# Patient Record
Sex: FEMALE | Race: WHITE | NOT HISPANIC OR LATINO
[De-identification: names, ages, dates, MRNs, and addresses within clinical notes are randomized per-mention and may not be internally consistent; named-entity substitution may affect disease eponyms.]

---

## 2017-01-20 ENCOUNTER — APPOINTMENT (OUTPATIENT)
Dept: PLASTIC SURGERY | Facility: CLINIC | Age: 52
End: 2017-01-20

## 2017-05-05 ENCOUNTER — APPOINTMENT (OUTPATIENT)
Dept: PLASTIC SURGERY | Facility: CLINIC | Age: 52
End: 2017-05-05

## 2017-07-14 ENCOUNTER — APPOINTMENT (OUTPATIENT)
Dept: PLASTIC SURGERY | Facility: CLINIC | Age: 52
End: 2017-07-14

## 2017-07-28 ENCOUNTER — APPOINTMENT (OUTPATIENT)
Dept: PLASTIC SURGERY | Facility: CLINIC | Age: 52
End: 2017-07-28
Payer: COMMERCIAL

## 2017-12-08 ENCOUNTER — APPOINTMENT (OUTPATIENT)
Dept: PLASTIC SURGERY | Facility: CLINIC | Age: 52
End: 2017-12-08
Payer: COMMERCIAL

## 2018-03-16 ENCOUNTER — RESULT REVIEW (OUTPATIENT)
Age: 53
End: 2018-03-16

## 2018-03-16 ENCOUNTER — OUTPATIENT (OUTPATIENT)
Dept: OUTPATIENT SERVICES | Facility: HOSPITAL | Age: 53
LOS: 1 days | End: 2018-03-16
Payer: MEDICAID

## 2018-03-16 PROCEDURE — 88321 CONSLTJ&REPRT SLD PREP ELSWR: CPT

## 2018-03-19 ENCOUNTER — APPOINTMENT (OUTPATIENT)
Dept: GYNECOLOGIC ONCOLOGY | Facility: CLINIC | Age: 53
End: 2018-03-19
Payer: MEDICAID

## 2018-03-19 VITALS
BODY MASS INDEX: 22.08 KG/M2 | HEART RATE: 76 BPM | HEIGHT: 62 IN | WEIGHT: 120 LBS | DIASTOLIC BLOOD PRESSURE: 82 MMHG | SYSTOLIC BLOOD PRESSURE: 127 MMHG

## 2018-03-19 PROCEDURE — 57452 EXAM OF CERVIX W/SCOPE: CPT

## 2018-03-19 PROCEDURE — 99204 OFFICE O/P NEW MOD 45 MIN: CPT | Mod: 25

## 2018-03-20 LAB — SURGICAL PATHOLOGY STUDY: SIGNIFICANT CHANGE UP

## 2018-03-26 ENCOUNTER — OUTPATIENT (OUTPATIENT)
Dept: OUTPATIENT SERVICES | Facility: HOSPITAL | Age: 53
LOS: 1 days | End: 2018-03-26

## 2018-03-26 VITALS
DIASTOLIC BLOOD PRESSURE: 64 MMHG | HEART RATE: 60 BPM | WEIGHT: 119.93 LBS | SYSTOLIC BLOOD PRESSURE: 100 MMHG | RESPIRATION RATE: 20 BRPM | TEMPERATURE: 98 F | HEIGHT: 61.25 IN

## 2018-03-26 DIAGNOSIS — D06.9 CARCINOMA IN SITU OF CERVIX, UNSPECIFIED: ICD-10-CM

## 2018-03-26 DIAGNOSIS — Z98.891 HISTORY OF UTERINE SCAR FROM PREVIOUS SURGERY: Chronic | ICD-10-CM

## 2018-03-26 DIAGNOSIS — Z98.82 BREAST IMPLANT STATUS: Chronic | ICD-10-CM

## 2018-03-26 DIAGNOSIS — M21.611 BUNION OF RIGHT FOOT: Chronic | ICD-10-CM

## 2018-03-26 DIAGNOSIS — M21.612 BUNION OF LEFT FOOT: Chronic | ICD-10-CM

## 2018-03-26 DIAGNOSIS — D09.9 CARCINOMA IN SITU, UNSPECIFIED: ICD-10-CM

## 2018-03-26 LAB
HCG SERPL-ACNC: < 5 MIU/ML — SIGNIFICANT CHANGE UP
HCT VFR BLD CALC: 44.1 % — SIGNIFICANT CHANGE UP (ref 34.5–45)
HGB BLD-MCNC: 14.9 G/DL — SIGNIFICANT CHANGE UP (ref 11.5–15.5)
MCHC RBC-ENTMCNC: 30.1 PG — SIGNIFICANT CHANGE UP (ref 27–34)
MCHC RBC-ENTMCNC: 33.8 % — SIGNIFICANT CHANGE UP (ref 32–36)
MCV RBC AUTO: 89.1 FL — SIGNIFICANT CHANGE UP (ref 80–100)
NRBC # FLD: 0 — SIGNIFICANT CHANGE UP
PLATELET # BLD AUTO: 220 K/UL — SIGNIFICANT CHANGE UP (ref 150–400)
PMV BLD: 10.4 FL — SIGNIFICANT CHANGE UP (ref 7–13)
RBC # BLD: 4.95 M/UL — SIGNIFICANT CHANGE UP (ref 3.8–5.2)
RBC # FLD: 12 % — SIGNIFICANT CHANGE UP (ref 10.3–14.5)
WBC # BLD: 5.98 K/UL — SIGNIFICANT CHANGE UP (ref 3.8–10.5)
WBC # FLD AUTO: 5.98 K/UL — SIGNIFICANT CHANGE UP (ref 3.8–10.5)

## 2018-03-26 RX ORDER — SODIUM CHLORIDE 9 MG/ML
1000 INJECTION, SOLUTION INTRAVENOUS
Qty: 0 | Refills: 0 | Status: DISCONTINUED | OUTPATIENT
Start: 2018-03-28 | End: 2018-03-29

## 2018-03-26 NOTE — H&P PST ADULT - PROBLEM SELECTOR PLAN 1
[facility]    Pre op instructions including Pepcid given to pt pt appears to have a good understanding of pre op instructions    PT with multiple herbal supplements ; pt to call 3/27/18 with list supplements; pt dc d 3/26/18

## 2018-03-26 NOTE — H&P PST ADULT - PSH
Bunion of great toe of right foot    Bunion of left foot    History of  section  ,   S/P breast augmentation

## 2018-03-26 NOTE — H&P PST ADULT - HISTORY OF PRESENT ILLNESS
Pt is a 52 y.o. female ; pt reports h/o abnormal pap smear ; h/o hpv ; s/p colposcopy ; pt to surgeon ; pt now presents for [facility]

## 2018-03-26 NOTE — H&P PST ADULT - PMH
Bilateral shoulder pain  Right ? Left ; pt reports partial tear meniscus bilaterally  GERD (gastroesophageal reflux disease)    HPV in female    IBS (irritable bowel syndrome)    Knee pain, right  "partial meniscus tear" Bilateral shoulder pain  Right / Left ; pt reports partial tear meniscus bilaterally  GERD (gastroesophageal reflux disease)    HPV in female    IBS (irritable bowel syndrome)    Knee pain, right  "partial meniscus tear"

## 2018-03-26 NOTE — H&P PST ADULT - LYMPHATIC
supraclavicular L/anterior cervical R/anterior cervical L/posterior cervical L/posterior cervical R/supraclavicular R

## 2018-03-26 NOTE — H&P PST ADULT - NSANTHOSAYNRD_GEN_A_CORE
No. MAYR screening performed.  STOP BANG Legend: 0-2 = LOW Risk; 3-4 = INTERMEDIATE Risk; 5-8 = HIGH Risk

## 2018-03-26 NOTE — H&P PST ADULT - FAMILY HISTORY
Father  Still living? No  Family history of diabetes mellitus, Age at diagnosis: Age Unknown     Mother  Still living? Yes, Estimated age: 81-90  Family history of peripheral vascular disease, Age at diagnosis: Age Unknown

## 2018-03-27 ENCOUNTER — TRANSCRIPTION ENCOUNTER (OUTPATIENT)
Age: 53
End: 2018-03-27

## 2018-03-28 ENCOUNTER — APPOINTMENT (OUTPATIENT)
Dept: GYNECOLOGIC ONCOLOGY | Facility: HOSPITAL | Age: 53
End: 2018-03-28

## 2018-03-28 ENCOUNTER — OUTPATIENT (OUTPATIENT)
Dept: OUTPATIENT SERVICES | Facility: HOSPITAL | Age: 53
LOS: 1 days | Discharge: HOME | End: 2018-03-28
Payer: MEDICAID

## 2018-03-28 ENCOUNTER — RESULT REVIEW (OUTPATIENT)
Age: 53
End: 2018-03-28

## 2018-03-28 VITALS
HEART RATE: 57 BPM | RESPIRATION RATE: 14 BRPM | HEIGHT: 61 IN | OXYGEN SATURATION: 100 % | TEMPERATURE: 98 F | DIASTOLIC BLOOD PRESSURE: 76 MMHG | WEIGHT: 119.05 LBS | SYSTOLIC BLOOD PRESSURE: 122 MMHG

## 2018-03-28 VITALS
HEART RATE: 55 BPM | OXYGEN SATURATION: 100 % | SYSTOLIC BLOOD PRESSURE: 100 MMHG | RESPIRATION RATE: 12 BRPM | DIASTOLIC BLOOD PRESSURE: 64 MMHG

## 2018-03-28 DIAGNOSIS — M21.611 BUNION OF RIGHT FOOT: Chronic | ICD-10-CM

## 2018-03-28 DIAGNOSIS — D06.9 CARCINOMA IN SITU OF CERVIX, UNSPECIFIED: ICD-10-CM

## 2018-03-28 DIAGNOSIS — Z98.891 HISTORY OF UTERINE SCAR FROM PREVIOUS SURGERY: Chronic | ICD-10-CM

## 2018-03-28 DIAGNOSIS — Z98.82 BREAST IMPLANT STATUS: Chronic | ICD-10-CM

## 2018-03-28 DIAGNOSIS — M21.612 BUNION OF LEFT FOOT: Chronic | ICD-10-CM

## 2018-03-28 PROCEDURE — 57520 CONIZATION OF CERVIX: CPT

## 2018-03-28 NOTE — ASU DISCHARGE PLAN (ADULT/PEDIATRIC). - ACTIVITY LEVEL
weight bearing as tolerated/nothing per vagina nothing per vagina/no tub baths/x 2 weeks/no douching/no tampons/no intercourse

## 2018-03-28 NOTE — ASU DISCHARGE PLAN (ADULT/PEDIATRIC). - NOTIFY
Inability to Tolerate Liquids or Foods/Pain not relieved by Medications/Bleeding that does not stop Persistent Nausea and Vomiting/Inability to Tolerate Liquids or Foods/Pain not relieved by Medications/GYN Fever>100.4/Bleeding that does not stop

## 2018-03-28 NOTE — ASU DISCHARGE PLAN (ADULT/PEDIATRIC). - NURSING INSTRUCTIONS
You received IV  tylenol and IV toradol in the OR at 10 am, your next dose of tylenol and/ or  motrin ( if needed) will be in 6 hrs at 4pm

## 2018-03-28 NOTE — ASU DISCHARGE PLAN (ADULT/PEDIATRIC). - MEDICATION SUMMARY - MEDICATIONS TO TAKE
I will START or STAY ON the medications listed below when I get home from the hospital:    herbal supplements  -- Pt takes takes multiple supplemets [ 13 ] change on a monthly basis   -- Indication: For supplement    Sinhala black radish  -- 1 tab(s)  once a day  -- Indication: For supplement    Fish Oil oral capsule  -- 1 tab(s) by mouth once a day  -- Indication: For supplement

## 2018-03-30 ENCOUNTER — APPOINTMENT (OUTPATIENT)
Dept: PLASTIC SURGERY | Facility: CLINIC | Age: 53
End: 2018-03-30
Payer: COMMERCIAL

## 2018-03-30 DIAGNOSIS — M79.3 PANNICULITIS, UNSPECIFIED: ICD-10-CM

## 2018-03-30 DIAGNOSIS — E88.1 LIPODYSTROPHY, NOT ELSEWHERE CLASSIFIED: ICD-10-CM

## 2018-03-30 PROCEDURE — 99213 OFFICE O/P EST LOW 20 MIN: CPT

## 2018-04-02 LAB — SURGICAL PATHOLOGY STUDY: SIGNIFICANT CHANGE UP

## 2018-04-04 ENCOUNTER — APPOINTMENT (OUTPATIENT)
Dept: GYNECOLOGIC ONCOLOGY | Facility: CLINIC | Age: 53
End: 2018-04-04
Payer: MEDICAID

## 2018-04-04 VITALS
HEIGHT: 62 IN | WEIGHT: 120 LBS | HEART RATE: 67 BPM | TEMPERATURE: 98.4 F | SYSTOLIC BLOOD PRESSURE: 114 MMHG | DIASTOLIC BLOOD PRESSURE: 74 MMHG | BODY MASS INDEX: 22.08 KG/M2

## 2018-04-04 PROCEDURE — 99024 POSTOP FOLLOW-UP VISIT: CPT | Mod: NC

## 2018-04-19 PROBLEM — E88.1 LIPODYSTROPHY: Status: ACTIVE | Noted: 2018-04-19

## 2018-04-19 PROBLEM — M79.3 PANNICULITIS: Status: ACTIVE | Noted: 2018-04-19

## 2018-05-08 ENCOUNTER — OUTPATIENT (OUTPATIENT)
Dept: OUTPATIENT SERVICES | Facility: HOSPITAL | Age: 53
LOS: 1 days | End: 2018-05-08

## 2018-05-08 VITALS
WEIGHT: 271.17 LBS | TEMPERATURE: 98 F | DIASTOLIC BLOOD PRESSURE: 60 MMHG | HEIGHT: 61 IN | HEART RATE: 67 BPM | SYSTOLIC BLOOD PRESSURE: 100 MMHG | RESPIRATION RATE: 16 BRPM

## 2018-05-08 DIAGNOSIS — E65 LOCALIZED ADIPOSITY: ICD-10-CM

## 2018-05-08 DIAGNOSIS — M21.612 BUNION OF LEFT FOOT: Chronic | ICD-10-CM

## 2018-05-08 DIAGNOSIS — Z98.891 HISTORY OF UTERINE SCAR FROM PREVIOUS SURGERY: Chronic | ICD-10-CM

## 2018-05-08 DIAGNOSIS — M21.611 BUNION OF RIGHT FOOT: Chronic | ICD-10-CM

## 2018-05-08 DIAGNOSIS — C53.9 MALIGNANT NEOPLASM OF CERVIX UTERI, UNSPECIFIED: ICD-10-CM

## 2018-05-08 DIAGNOSIS — Z98.890 OTHER SPECIFIED POSTPROCEDURAL STATES: Chronic | ICD-10-CM

## 2018-05-08 DIAGNOSIS — Z98.82 BREAST IMPLANT STATUS: Chronic | ICD-10-CM

## 2018-05-08 LAB
ALBUMIN SERPL ELPH-MCNC: 4.2 G/DL — SIGNIFICANT CHANGE UP (ref 3.3–5)
ALP SERPL-CCNC: 79 U/L — SIGNIFICANT CHANGE UP (ref 40–120)
ALT FLD-CCNC: 17 U/L — SIGNIFICANT CHANGE UP (ref 4–33)
AST SERPL-CCNC: 24 U/L — SIGNIFICANT CHANGE UP (ref 4–32)
BILIRUB SERPL-MCNC: 0.3 MG/DL — SIGNIFICANT CHANGE UP (ref 0.2–1.2)
BLD GP AB SCN SERPL QL: NEGATIVE — SIGNIFICANT CHANGE UP
BUN SERPL-MCNC: 18 MG/DL — SIGNIFICANT CHANGE UP (ref 7–23)
CALCIUM SERPL-MCNC: 9.2 MG/DL — SIGNIFICANT CHANGE UP (ref 8.4–10.5)
CHLORIDE SERPL-SCNC: 99 MMOL/L — SIGNIFICANT CHANGE UP (ref 98–107)
CO2 SERPL-SCNC: 27 MMOL/L — SIGNIFICANT CHANGE UP (ref 22–31)
CREAT SERPL-MCNC: 0.82 MG/DL — SIGNIFICANT CHANGE UP (ref 0.5–1.3)
GLUCOSE SERPL-MCNC: 80 MG/DL — SIGNIFICANT CHANGE UP (ref 70–99)
HCG SERPL-ACNC: < 5 MIU/ML — SIGNIFICANT CHANGE UP
HCT VFR BLD CALC: 42.6 % — SIGNIFICANT CHANGE UP (ref 34.5–45)
HGB BLD-MCNC: 14.4 G/DL — SIGNIFICANT CHANGE UP (ref 11.5–15.5)
MCHC RBC-ENTMCNC: 30.6 PG — SIGNIFICANT CHANGE UP (ref 27–34)
MCHC RBC-ENTMCNC: 33.8 % — SIGNIFICANT CHANGE UP (ref 32–36)
MCV RBC AUTO: 90.4 FL — SIGNIFICANT CHANGE UP (ref 80–100)
NRBC # FLD: 0 — SIGNIFICANT CHANGE UP
PLATELET # BLD AUTO: 221 K/UL — SIGNIFICANT CHANGE UP (ref 150–400)
PMV BLD: 10.5 FL — SIGNIFICANT CHANGE UP (ref 7–13)
POTASSIUM SERPL-MCNC: 4 MMOL/L — SIGNIFICANT CHANGE UP (ref 3.5–5.3)
POTASSIUM SERPL-SCNC: 4 MMOL/L — SIGNIFICANT CHANGE UP (ref 3.5–5.3)
PROT SERPL-MCNC: 7.1 G/DL — SIGNIFICANT CHANGE UP (ref 6–8.3)
RBC # BLD: 4.71 M/UL — SIGNIFICANT CHANGE UP (ref 3.8–5.2)
RBC # FLD: 12.6 % — SIGNIFICANT CHANGE UP (ref 10.3–14.5)
RH IG SCN BLD-IMP: POSITIVE — SIGNIFICANT CHANGE UP
SODIUM SERPL-SCNC: 138 MMOL/L — SIGNIFICANT CHANGE UP (ref 135–145)
WBC # BLD: 5.61 K/UL — SIGNIFICANT CHANGE UP (ref 3.8–10.5)
WBC # FLD AUTO: 5.61 K/UL — SIGNIFICANT CHANGE UP (ref 3.8–10.5)

## 2018-05-08 RX ORDER — SODIUM CHLORIDE 9 MG/ML
3 INJECTION INTRAMUSCULAR; INTRAVENOUS; SUBCUTANEOUS EVERY 8 HOURS
Qty: 0 | Refills: 0 | Status: DISCONTINUED | OUTPATIENT
Start: 2018-05-11 | End: 2018-05-14

## 2018-05-08 NOTE — H&P PST ADULT - ATTENDING COMMENTS
Plan for NADEGE/B salpingectomy/possible oophorectomy/possible staging.  Discussed risks including bleeding/blood transfusion/infection/injury to bowel/bladder/ureter/blood vessels

## 2018-05-08 NOTE — H&P PST ADULT - NEGATIVE NEUROLOGICAL SYMPTOMS
no weakness/no generalized seizures/no transient paralysis/no paresthesias/no focal seizures/no syncope

## 2018-05-08 NOTE — H&P PST ADULT - PMH
Bilateral shoulder pain  Right / Left ; pt reports partial tear meniscus bilaterally  GERD (gastroesophageal reflux disease)    HPV in female    IBS (irritable bowel syndrome)    Knee pain, right  "partial meniscus tear" Bilateral shoulder pain  Right / Left ; pt reports partial tear meniscus bilaterally  Cervical ca    GERD (gastroesophageal reflux disease)    HPV in female    IBS (irritable bowel syndrome)    Knee pain, right  "partial meniscus tear"

## 2018-05-08 NOTE — H&P PST ADULT - PSH
Bunion of great toe of right foot    Bunion of left foot    History of  section  ,   S/P breast augmentation   Bunion of great toe of right foot    Bunion of left foot    H/O colposcopy with cervical biopsy    History of  section  ,   S/P breast augmentation

## 2018-05-08 NOTE — H&P PST ADULT - NEGATIVE ENMT SYMPTOMS
no throat pain/no sinus symptoms/no dysphagia/no vertigo/no tinnitus/no hearing difficulty/no nasal congestion

## 2018-05-08 NOTE — H&P PST ADULT - PROBLEM SELECTOR PLAN 1
Pt given pre-op instructions and Famotidine and Chlorhexidine and verbalized understanding  Pt to see PCP for [facility] as per surgeon - forms given

## 2018-05-10 ENCOUNTER — TRANSCRIPTION ENCOUNTER (OUTPATIENT)
Age: 53
End: 2018-05-10

## 2018-05-11 ENCOUNTER — APPOINTMENT (OUTPATIENT)
Dept: GYNECOLOGIC ONCOLOGY | Facility: HOSPITAL | Age: 53
End: 2018-05-11

## 2018-05-11 ENCOUNTER — RESULT REVIEW (OUTPATIENT)
Age: 53
End: 2018-05-11

## 2018-05-11 ENCOUNTER — INPATIENT (INPATIENT)
Facility: HOSPITAL | Age: 53
LOS: 2 days | Discharge: HOME | End: 2018-05-14
Attending: OBSTETRICS & GYNECOLOGY | Admitting: OBSTETRICS & GYNECOLOGY
Payer: MEDICAID

## 2018-05-11 VITALS
TEMPERATURE: 98 F | OXYGEN SATURATION: 97 % | DIASTOLIC BLOOD PRESSURE: 75 MMHG | WEIGHT: 123.02 LBS | HEART RATE: 75 BPM | SYSTOLIC BLOOD PRESSURE: 111 MMHG | HEIGHT: 61 IN | RESPIRATION RATE: 16 BRPM

## 2018-05-11 DIAGNOSIS — M21.612 BUNION OF LEFT FOOT: Chronic | ICD-10-CM

## 2018-05-11 DIAGNOSIS — Z98.891 HISTORY OF UTERINE SCAR FROM PREVIOUS SURGERY: Chronic | ICD-10-CM

## 2018-05-11 DIAGNOSIS — M21.611 BUNION OF RIGHT FOOT: Chronic | ICD-10-CM

## 2018-05-11 DIAGNOSIS — Z98.890 OTHER SPECIFIED POSTPROCEDURAL STATES: Chronic | ICD-10-CM

## 2018-05-11 DIAGNOSIS — Z98.82 BREAST IMPLANT STATUS: Chronic | ICD-10-CM

## 2018-05-11 DIAGNOSIS — E65 LOCALIZED ADIPOSITY: ICD-10-CM

## 2018-05-11 LAB
GLUCOSE BLDC GLUCOMTR-MCNC: 99 MG/DL — SIGNIFICANT CHANGE UP (ref 70–99)
HCG UR QL: NEGATIVE — SIGNIFICANT CHANGE UP

## 2018-05-11 PROCEDURE — 88331 PATH CONSLTJ SURG 1 BLK 1SPC: CPT | Mod: 26

## 2018-05-11 PROCEDURE — 88302 TISSUE EXAM BY PATHOLOGIST: CPT | Mod: 26

## 2018-05-11 PROCEDURE — 58150 TOTAL HYSTERECTOMY: CPT | Mod: GC

## 2018-05-11 PROCEDURE — 88309 TISSUE EXAM BY PATHOLOGIST: CPT | Mod: 26

## 2018-05-11 RX ORDER — ACETAMINOPHEN 500 MG
1000 TABLET ORAL ONCE
Qty: 0 | Refills: 0 | Status: COMPLETED | OUTPATIENT
Start: 2018-05-11 | End: 2018-05-12

## 2018-05-11 RX ORDER — ACETAMINOPHEN 500 MG
1000 TABLET ORAL ONCE
Qty: 0 | Refills: 0 | Status: COMPLETED | OUTPATIENT
Start: 2018-05-12 | End: 2018-05-12

## 2018-05-11 RX ORDER — FENTANYL CITRATE 50 UG/ML
25 INJECTION INTRAVENOUS
Qty: 0 | Refills: 0 | Status: DISCONTINUED | OUTPATIENT
Start: 2018-05-11 | End: 2018-05-12

## 2018-05-11 RX ORDER — ONDANSETRON 8 MG/1
4 TABLET, FILM COATED ORAL ONCE
Qty: 0 | Refills: 0 | Status: COMPLETED | OUTPATIENT
Start: 2018-05-11 | End: 2018-05-11

## 2018-05-11 RX ORDER — ACETAMINOPHEN 500 MG
1000 TABLET ORAL EVERY 6 HOURS
Qty: 0 | Refills: 0 | Status: DISCONTINUED | OUTPATIENT
Start: 2018-05-11 | End: 2018-05-12

## 2018-05-11 RX ORDER — HEPARIN SODIUM 5000 [USP'U]/ML
5000 INJECTION INTRAVENOUS; SUBCUTANEOUS ONCE
Qty: 0 | Refills: 0 | Status: COMPLETED | OUTPATIENT
Start: 2018-05-11 | End: 2018-05-11

## 2018-05-11 RX ORDER — SODIUM CHLORIDE 9 MG/ML
1000 INJECTION, SOLUTION INTRAVENOUS
Qty: 0 | Refills: 0 | Status: DISCONTINUED | OUTPATIENT
Start: 2018-05-11 | End: 2018-05-12

## 2018-05-11 RX ORDER — CEPHALEXIN 500 MG
500 CAPSULE ORAL
Qty: 0 | Refills: 0 | Status: DISCONTINUED | OUTPATIENT
Start: 2018-05-11 | End: 2018-05-14

## 2018-05-11 RX ORDER — OXYCODONE HYDROCHLORIDE 5 MG/1
5 TABLET ORAL
Qty: 0 | Refills: 0 | Status: DISCONTINUED | OUTPATIENT
Start: 2018-05-11 | End: 2018-05-14

## 2018-05-11 RX ORDER — SODIUM CHLORIDE 9 MG/ML
1000 INJECTION, SOLUTION INTRAVENOUS
Qty: 0 | Refills: 0 | Status: DISCONTINUED | OUTPATIENT
Start: 2018-05-11 | End: 2018-05-13

## 2018-05-11 RX ORDER — FENTANYL CITRATE 50 UG/ML
50 INJECTION INTRAVENOUS
Qty: 0 | Refills: 0 | Status: DISCONTINUED | OUTPATIENT
Start: 2018-05-11 | End: 2018-05-12

## 2018-05-11 RX ADMIN — FENTANYL CITRATE 50 MICROGRAM(S): 50 INJECTION INTRAVENOUS at 22:00

## 2018-05-11 RX ADMIN — FENTANYL CITRATE 50 MICROGRAM(S): 50 INJECTION INTRAVENOUS at 21:28

## 2018-05-11 RX ADMIN — SODIUM CHLORIDE 30 MILLILITER(S): 9 INJECTION, SOLUTION INTRAVENOUS at 11:48

## 2018-05-11 RX ADMIN — FENTANYL CITRATE 50 MICROGRAM(S): 50 INJECTION INTRAVENOUS at 21:44

## 2018-05-11 RX ADMIN — FENTANYL CITRATE 50 MICROGRAM(S): 50 INJECTION INTRAVENOUS at 23:15

## 2018-05-11 RX ADMIN — ONDANSETRON 4 MILLIGRAM(S): 8 TABLET, FILM COATED ORAL at 22:28

## 2018-05-11 RX ADMIN — SODIUM CHLORIDE 3 MILLILITER(S): 9 INJECTION INTRAMUSCULAR; INTRAVENOUS; SUBCUTANEOUS at 22:14

## 2018-05-11 RX ADMIN — FENTANYL CITRATE 50 MICROGRAM(S): 50 INJECTION INTRAVENOUS at 23:00

## 2018-05-11 RX ADMIN — HEPARIN SODIUM 5000 UNIT(S): 5000 INJECTION INTRAVENOUS; SUBCUTANEOUS at 11:47

## 2018-05-11 RX ADMIN — FENTANYL CITRATE 50 MICROGRAM(S): 50 INJECTION INTRAVENOUS at 21:40

## 2018-05-11 NOTE — BRIEF OPERATIVE NOTE - PROCEDURE
<<-----Click on this checkbox to enter Procedure Fat grafting  05/11/2018    Active  [id number]  Abdominoplasty with liposuction  05/11/2018    Active  [id number]

## 2018-05-11 NOTE — BRIEF OPERATIVE NOTE - OPERATION/FINDINGS
Abdominoplasty w/ liposuction of flanks/hips/inner thighs; fat grafting to face; GYN: NADEGE, b/l salpingectomy

## 2018-05-12 LAB
BASOPHILS # BLD AUTO: 0.01 K/UL — SIGNIFICANT CHANGE UP (ref 0–0.2)
BASOPHILS # BLD AUTO: 0.02 K/UL — SIGNIFICANT CHANGE UP (ref 0–0.2)
BASOPHILS NFR BLD AUTO: 0.1 % — SIGNIFICANT CHANGE UP (ref 0–2)
BASOPHILS NFR BLD AUTO: 0.2 % — SIGNIFICANT CHANGE UP (ref 0–2)
BUN SERPL-MCNC: 9 MG/DL — SIGNIFICANT CHANGE UP (ref 7–23)
CALCIUM SERPL-MCNC: 7.8 MG/DL — LOW (ref 8.4–10.5)
CHLORIDE SERPL-SCNC: 95 MMOL/L — LOW (ref 98–107)
CO2 SERPL-SCNC: 23 MMOL/L — SIGNIFICANT CHANGE UP (ref 22–31)
CREAT SERPL-MCNC: 0.67 MG/DL — SIGNIFICANT CHANGE UP (ref 0.5–1.3)
EOSINOPHIL # BLD AUTO: 0 K/UL — SIGNIFICANT CHANGE UP (ref 0–0.5)
EOSINOPHIL # BLD AUTO: 0 K/UL — SIGNIFICANT CHANGE UP (ref 0–0.5)
EOSINOPHIL NFR BLD AUTO: 0 % — SIGNIFICANT CHANGE UP (ref 0–6)
EOSINOPHIL NFR BLD AUTO: 0 % — SIGNIFICANT CHANGE UP (ref 0–6)
GLUCOSE SERPL-MCNC: 142 MG/DL — HIGH (ref 70–99)
HCT VFR BLD CALC: 35.5 % — SIGNIFICANT CHANGE UP (ref 34.5–45)
HCT VFR BLD CALC: 37.2 % — SIGNIFICANT CHANGE UP (ref 34.5–45)
HGB BLD-MCNC: 11.9 G/DL — SIGNIFICANT CHANGE UP (ref 11.5–15.5)
HGB BLD-MCNC: 12.3 G/DL — SIGNIFICANT CHANGE UP (ref 11.5–15.5)
IMM GRANULOCYTES # BLD AUTO: 0.03 # — SIGNIFICANT CHANGE UP
IMM GRANULOCYTES # BLD AUTO: 0.05 # — SIGNIFICANT CHANGE UP
IMM GRANULOCYTES NFR BLD AUTO: 0.2 % — SIGNIFICANT CHANGE UP (ref 0–1.5)
IMM GRANULOCYTES NFR BLD AUTO: 0.4 % — SIGNIFICANT CHANGE UP (ref 0–1.5)
LYMPHOCYTES # BLD AUTO: 0.39 K/UL — LOW (ref 1–3.3)
LYMPHOCYTES # BLD AUTO: 0.6 K/UL — LOW (ref 1–3.3)
LYMPHOCYTES # BLD AUTO: 3 % — LOW (ref 13–44)
LYMPHOCYTES # BLD AUTO: 4.6 % — LOW (ref 13–44)
MAGNESIUM SERPL-MCNC: 1.5 MG/DL — LOW (ref 1.6–2.6)
MCHC RBC-ENTMCNC: 29.9 PG — SIGNIFICANT CHANGE UP (ref 27–34)
MCHC RBC-ENTMCNC: 30.4 PG — SIGNIFICANT CHANGE UP (ref 27–34)
MCHC RBC-ENTMCNC: 33.1 % — SIGNIFICANT CHANGE UP (ref 32–36)
MCHC RBC-ENTMCNC: 33.5 % — SIGNIFICANT CHANGE UP (ref 32–36)
MCV RBC AUTO: 90.3 FL — SIGNIFICANT CHANGE UP (ref 80–100)
MCV RBC AUTO: 90.6 FL — SIGNIFICANT CHANGE UP (ref 80–100)
MONOCYTES # BLD AUTO: 0.77 K/UL — SIGNIFICANT CHANGE UP (ref 0–0.9)
MONOCYTES # BLD AUTO: 1.03 K/UL — HIGH (ref 0–0.9)
MONOCYTES NFR BLD AUTO: 5.9 % — SIGNIFICANT CHANGE UP (ref 2–14)
MONOCYTES NFR BLD AUTO: 7.9 % — SIGNIFICANT CHANGE UP (ref 2–14)
NEUTROPHILS # BLD AUTO: 11.33 K/UL — HIGH (ref 1.8–7.4)
NEUTROPHILS # BLD AUTO: 11.8 K/UL — HIGH (ref 1.8–7.4)
NEUTROPHILS NFR BLD AUTO: 87.1 % — HIGH (ref 43–77)
NEUTROPHILS NFR BLD AUTO: 90.6 % — HIGH (ref 43–77)
NRBC # FLD: 0 — SIGNIFICANT CHANGE UP
NRBC # FLD: 0 — SIGNIFICANT CHANGE UP
PHOSPHATE SERPL-MCNC: 3 MG/DL — SIGNIFICANT CHANGE UP (ref 2.5–4.5)
PLATELET # BLD AUTO: 217 K/UL — SIGNIFICANT CHANGE UP (ref 150–400)
PLATELET # BLD AUTO: 236 K/UL — SIGNIFICANT CHANGE UP (ref 150–400)
PMV BLD: 10 FL — SIGNIFICANT CHANGE UP (ref 7–13)
PMV BLD: 10.6 FL — SIGNIFICANT CHANGE UP (ref 7–13)
POTASSIUM SERPL-MCNC: 3.8 MMOL/L — SIGNIFICANT CHANGE UP (ref 3.5–5.3)
POTASSIUM SERPL-SCNC: 3.8 MMOL/L — SIGNIFICANT CHANGE UP (ref 3.5–5.3)
RBC # BLD: 3.92 M/UL — SIGNIFICANT CHANGE UP (ref 3.8–5.2)
RBC # BLD: 4.12 M/UL — SIGNIFICANT CHANGE UP (ref 3.8–5.2)
RBC # FLD: 12.3 % — SIGNIFICANT CHANGE UP (ref 10.3–14.5)
RBC # FLD: 12.4 % — SIGNIFICANT CHANGE UP (ref 10.3–14.5)
SODIUM SERPL-SCNC: 134 MMOL/L — LOW (ref 135–145)
WBC # BLD: 13.01 K/UL — HIGH (ref 3.8–10.5)
WBC # BLD: 13.02 K/UL — HIGH (ref 3.8–10.5)
WBC # FLD AUTO: 13.01 K/UL — HIGH (ref 3.8–10.5)
WBC # FLD AUTO: 13.02 K/UL — HIGH (ref 3.8–10.5)

## 2018-05-12 RX ORDER — ONDANSETRON 8 MG/1
4 TABLET, FILM COATED ORAL ONCE
Qty: 0 | Refills: 0 | Status: COMPLETED | OUTPATIENT
Start: 2018-05-12 | End: 2018-05-12

## 2018-05-12 RX ORDER — MAGNESIUM SULFATE 500 MG/ML
2 VIAL (ML) INJECTION ONCE
Qty: 0 | Refills: 0 | Status: COMPLETED | OUTPATIENT
Start: 2018-05-12 | End: 2018-05-12

## 2018-05-12 RX ORDER — OXYCODONE HYDROCHLORIDE 5 MG/1
5 TABLET ORAL ONCE
Qty: 0 | Refills: 0 | Status: DISCONTINUED | OUTPATIENT
Start: 2018-05-12 | End: 2018-05-12

## 2018-05-12 RX ADMIN — OXYCODONE HYDROCHLORIDE 5 MILLIGRAM(S): 5 TABLET ORAL at 09:13

## 2018-05-12 RX ADMIN — ONDANSETRON 4 MILLIGRAM(S): 8 TABLET, FILM COATED ORAL at 10:32

## 2018-05-12 RX ADMIN — SODIUM CHLORIDE 125 MILLILITER(S): 9 INJECTION, SOLUTION INTRAVENOUS at 09:13

## 2018-05-12 RX ADMIN — Medication 1000 MILLIGRAM(S): at 00:31

## 2018-05-12 RX ADMIN — OXYCODONE HYDROCHLORIDE 5 MILLIGRAM(S): 5 TABLET ORAL at 01:03

## 2018-05-12 RX ADMIN — SODIUM CHLORIDE 3 MILLILITER(S): 9 INJECTION INTRAMUSCULAR; INTRAVENOUS; SUBCUTANEOUS at 05:58

## 2018-05-12 RX ADMIN — SODIUM CHLORIDE 3 MILLILITER(S): 9 INJECTION INTRAMUSCULAR; INTRAVENOUS; SUBCUTANEOUS at 21:41

## 2018-05-12 RX ADMIN — Medication 500 MILLIGRAM(S): at 00:16

## 2018-05-12 RX ADMIN — SODIUM CHLORIDE 125 MILLILITER(S): 9 INJECTION, SOLUTION INTRAVENOUS at 03:20

## 2018-05-12 RX ADMIN — OXYCODONE HYDROCHLORIDE 5 MILLIGRAM(S): 5 TABLET ORAL at 09:43

## 2018-05-12 RX ADMIN — ONDANSETRON 4 MILLIGRAM(S): 8 TABLET, FILM COATED ORAL at 01:03

## 2018-05-12 RX ADMIN — Medication 400 MILLIGRAM(S): at 05:59

## 2018-05-12 RX ADMIN — Medication 500 MILLIGRAM(S): at 12:40

## 2018-05-12 RX ADMIN — Medication 500 MILLIGRAM(S): at 17:09

## 2018-05-12 RX ADMIN — OXYCODONE HYDROCHLORIDE 5 MILLIGRAM(S): 5 TABLET ORAL at 17:39

## 2018-05-12 RX ADMIN — SODIUM CHLORIDE 3 MILLILITER(S): 9 INJECTION INTRAMUSCULAR; INTRAVENOUS; SUBCUTANEOUS at 14:31

## 2018-05-12 RX ADMIN — OXYCODONE HYDROCHLORIDE 5 MILLIGRAM(S): 5 TABLET ORAL at 04:00

## 2018-05-12 RX ADMIN — SODIUM CHLORIDE 125 MILLILITER(S): 9 INJECTION, SOLUTION INTRAVENOUS at 10:32

## 2018-05-12 RX ADMIN — Medication 400 MILLIGRAM(S): at 00:16

## 2018-05-12 RX ADMIN — OXYCODONE HYDROCHLORIDE 5 MILLIGRAM(S): 5 TABLET ORAL at 17:09

## 2018-05-12 RX ADMIN — OXYCODONE HYDROCHLORIDE 5 MILLIGRAM(S): 5 TABLET ORAL at 02:00

## 2018-05-12 RX ADMIN — Medication 50 GRAM(S): at 15:40

## 2018-05-12 RX ADMIN — SODIUM CHLORIDE 125 MILLILITER(S): 9 INJECTION, SOLUTION INTRAVENOUS at 01:03

## 2018-05-12 RX ADMIN — SODIUM CHLORIDE 125 MILLILITER(S): 9 INJECTION, SOLUTION INTRAVENOUS at 05:59

## 2018-05-12 RX ADMIN — Medication 500 MILLIGRAM(S): at 23:54

## 2018-05-12 RX ADMIN — ONDANSETRON 4 MILLIGRAM(S): 8 TABLET, FILM COATED ORAL at 03:30

## 2018-05-12 RX ADMIN — OXYCODONE HYDROCHLORIDE 5 MILLIGRAM(S): 5 TABLET ORAL at 03:20

## 2018-05-12 RX ADMIN — Medication 1000 MILLIGRAM(S): at 06:15

## 2018-05-12 RX ADMIN — Medication 500 MILLIGRAM(S): at 06:04

## 2018-05-12 RX ADMIN — ONDANSETRON 4 MILLIGRAM(S): 8 TABLET, FILM COATED ORAL at 17:56

## 2018-05-12 NOTE — PROGRESS NOTE ADULT - ATTENDING COMMENTS
Seen on rounds with resident. Labs and flow sheet reviewed. Will follow Plastics recs. Continue taylor, atb. PCS and ambulation as per Plastics guidelines. Clears, advance as tolerated. Seen on rounds with resident. Labs and flow sheet reviewed. Will follow Plastics recs. Continue taylor, atb. PCS and ambulation as per Plastics guidelines. Clears, advance as tolerated. Not amended, pt had a bilateral salpingectomy not a BSO. D/w the pt that ovaries were conserved.

## 2018-05-12 NOTE — PROGRESS NOTE ADULT - SUBJECTIVE AND OBJECTIVE BOX
PA GynOnc Post Op Note    Pt seen and examined at bedside. Pt still in PACU. Pt resting comfortably.  Pt denies fever, chills, chest pain, SOB, nausea, vomiting, lightheadedness, dizziness.  Taylor catheter in place.      T(F): 98.4 (05-11-18 @ 23:00), Max: 98.4 (05-11-18 @ 23:00)  HR: 71 (05-12-18 @ 00:00) (66 - 96)  BP: 128/79 (05-12-18 @ 00:00) (109/69 - 132/82)  RR: 16 (05-12-18 @ 00:00) (14 - 26)  SpO2: 96% (05-12-18 @ 00:00) (95% - 100%)  Wt(kg): --  I&O's Detail    11 May 2018 07:01  -  12 May 2018 00:58  --------------------------------------------------------  IN:    lactated ringers.: 625 mL  Total IN: 625 mL    OUT:    Bulb: 32.5 mL    Indwelling Catheter - Urethral: 555 mL  Total OUT: 587.5 mL    Total NET: 37.5 mL          Physical Exam:  Constitutional: WDWN female, NAD AxOx3  Chest: s1s2+, RRR, clear to auscultation bilaterally, no w/r/r    Abdomen: soft, nondistended, no guarding, no rebound, [] bowel sounds,  Appropriate tenderness noted   Incisional site:  vertical dressing clean, dry, intact.   Extremities: no lower extremity edema or calf tenderness bilaterally; intermittent compression stockings in place     LABS:                        11.9   13.02 )-----------( 217      ( 11 May 2018 23:32 )             35.5                   a/p: This 52y female, s/p     CV: hemodynamically stable  PUL: adequate on RA  GI: NPO at present, tolerating   : taylor in place, d/c   ID: afebrile, WBC stable, labs pending []  DVT prophylaxis:   Pain Management: controlled  d/w NIK Perera  #[id number] PA GynOn Post Op Note    Pt seen and examined at bedside. Pt still in PACU. Pt resting comfortably.  Pt denies fever, chills, chest pain, SOB, nausea, vomiting, lightheadedness, dizziness.  Taylor catheter in place.  Abdominal binder in place    T(F): 98.4 (05-11-18 @ 23:00), Max: 98.4 (05-11-18 @ 23:00)  HR: 71 (05-12-18 @ 00:00) (66 - 96)  BP: 128/79 (05-12-18 @ 00:00) (109/69 - 132/82)  RR: 16 (05-12-18 @ 00:00) (14 - 26)  SpO2: 96% (05-12-18 @ 00:00) (95% - 100%)  Wt(kg): --  I&O's Detail    11 May 2018 07:01  -  12 May 2018 00:58  --------------------------------------------------------  IN:    lactated ringers.: 625 mL  Total IN: 625 mL    OUT:    Bulb: 32.5 mL    Indwelling Catheter - Urethral: 555 mL  Total OUT: 587.5 mL    Total NET: 37.5 mL    Physical Exam:  Constitutional: WDWN female, NAD AxOx3  Face: sand bags b/l to checks noted  Chest: s1s2+, RRR, clear to auscultation bilaterally, no w/r/r    Abdomen: soft, nondistended, no guarding, no rebound, no bowel sounds,  Appropriate tenderness noted   Incisional site:  vertical dressing clean, dry, intact. abdominal binder in place  Drains: b/l subcut drains both patent and with serosang fluid noted  Extremities: no lower extremity edema or calf tenderness bilaterally; intermittent compression stockings in place     LABS:                        11.9   13.02 )-----------( 217      ( 11 May 2018 23:32 )             35.5       a/p: This 52y female, s/p ExLap, NADEGE, BSO, Abdominoplasty, liposuction of flanks, hips, inner thighs,  fat grafting to face stable at present time    CV: hemodynamically stable  PUL: adequate on RA  GI: NPO at present, tolerating ice chips  : taylor in place, adequate output  ID: afebrile, WBC stable, labs pending for am  DVT prophylaxis:  sqheparin  Pain Management: controlled   d/w gynonc team  -continue with current post op care    NIK Ortega  #[id number] PA GynOn Post Op Note    Pt seen and examined at bedside. Pt still in PACU. Pt resting comfortably.  Pt denies fever, chills, chest pain, SOB, nausea, vomiting, lightheadedness, dizziness.  Taylor catheter in place.  Abdominal binder in place    T(F): 98.4 (05-11-18 @ 23:00), Max: 98.4 (05-11-18 @ 23:00)  HR: 71 (05-12-18 @ 00:00) (66 - 96)  BP: 128/79 (05-12-18 @ 00:00) (109/69 - 132/82)  RR: 16 (05-12-18 @ 00:00) (14 - 26)  SpO2: 96% (05-12-18 @ 00:00) (95% - 100%)  Wt(kg): --  I&O's Detail    11 May 2018 07:01  -  12 May 2018 00:58  --------------------------------------------------------  IN:    lactated ringers.: 625 mL  Total IN: 625 mL    OUT:    Bulb: 32.5 mL    Indwelling Catheter - Urethral: 555 mL  Total OUT: 587.5 mL    Total NET: 37.5 mL    Physical Exam:  Constitutional: WDWN female, NAD AxOx3  Face: ice bags b/l to checks noted  Chest: s1s2+, RRR, clear to auscultation bilaterally, no w/r/r    Abdomen: soft, nondistended, no guarding, no rebound, no bowel sounds,  Appropriate tenderness noted   Incisional site:  vertical dressing clean, dry, intact. abdominal binder in place  Drains: b/l subcut drains both patent and with serosang fluid noted  Extremities: no lower extremity edema or calf tenderness bilaterally; intermittent compression stockings in place     LABS:                        11.9   13.02 )-----------( 217      ( 11 May 2018 23:32 )             35.5       a/p: This 52y female, s/p ExLap, NADEGE, BSO, Abdominoplasty, liposuction of flanks, hips, inner thighs,  fat grafting to face stable at present time    CV: hemodynamically stable  PUL: adequate on RA  GI: NPO at present, tolerating ice chips  : taylor in place, adequate output  ID: afebrile, WBC stable, labs pending for am  DVT prophylaxis:  sqheparin  Pain Management: controlled   d/w gynonc team  -continue with current post op care    NIK Ortega  #[id number]

## 2018-05-12 NOTE — PROGRESS NOTE ADULT - SUBJECTIVE AND OBJECTIVE BOX
PA GynOnc Progress Note POD #    Pt seen, examined at bedside and doing well while meeting all post operative milestones. Pt states mild abdominal pain.  Pt denies fever, chills, chest pain, SOB, nausea, vomiting, lightheadedness, dizziness.  Pt states passing flatus, []BM    T(F): 97.7 (05-12-18 @ 00:52), Max: 98.4 (05-11-18 @ 23:00)  HR: 76 (05-12-18 @ 00:52) (66 - 96)  BP: 119/79 (05-12-18 @ 00:52) (109/69 - 132/82)  RR: 18 (05-12-18 @ 00:52) (14 - 26)  SpO2: 99% (05-12-18 @ 00:52) (95% - 100%)  Wt(kg): --  CAPILLARY BLOOD GLUCOSE    I&O's Detail    11 May 2018 07:01  -  12 May 2018 05:51  --------------------------------------------------------  IN:    lactated ringers.: 1125 mL  Total IN: 1125 mL    OUT:    Bulb: 52.5 mL    Bulb: 5 mL    Indwelling Catheter - Urethral: 955 mL  Total OUT: 1012.5 mL    Total NET: 112.5 mL          MEDICATIONS  (STANDING):  acetaminophen  IVPB. 1000 milliGRAM(s) IV Intermittent once  cephalexin 500 milliGRAM(s) Oral four times a day  lactated ringers. 1000 milliLiter(s) (125 mL/Hr) IV Continuous <Continuous>  sodium chloride 0.9% lock flush 3 milliLiter(s) IV Push every 8 hours    MEDICATIONS  (PRN):  oxyCODONE    IR 5 milliGRAM(s) Oral every 3 hours PRN Severe Pain (7 - 10)      Physical Exam:  Constitutional: WDWN female, NAD AxOx3  Skin: no breakdowns noted, warm and dry  Chest: s1s2+, RRR, clear to auscultation bilaterally, no w/r/r    Abdomen: softly distended, no guarding, no rebound, [] bowel sounds, appropriate tenderness noted   Incision site:   vertical incision clean and dry with []intact.    Extremities: no lower extremity edema or calf tenderness bilaterally; intermittent compression stockings in place     LABS:             11.9   13.02 )-----------( 217      ( 05-11 @ 23:32 )             35.5                       RADIOLOGY & ADDITIONAL TESTS:    a/p: This 52y female, s/p     CV: hemodynamically stable, H/H []  PUL: adequate on RA  GI:   :  ID: afebrile, WBC stable  DVT prophylaxis:   Pain Management: controlled on []  d/w    -encourage ambulation  -encourage incentive spirometry  -start d/c planning  continue with current care    NIK Ortega  #[id number] PA GynOnc Progress Note POD #1    Pt seen, examined at bedside and doing well while meeting all post operative milestones. Pt states mild abdominal pain and facial pain.  Pt states she had a cold/cough prior to surgery.  Pt denies fever, chills, chest pain, SOB, nausea, vomiting, lightheadedness, dizziness. Pt has ice packs on b/l facial cheeks.    T(F): 97.7 (05-12-18 @ 00:52), Max: 98.4 (05-11-18 @ 23:00)  HR: 76 (05-12-18 @ 00:52) (66 - 96)  BP: 119/79 (05-12-18 @ 00:52) (109/69 - 132/82)  RR: 18 (05-12-18 @ 00:52) (14 - 26)  SpO2: 99% (05-12-18 @ 00:52) (95% - 100%)    CAPILLARY BLOOD GLUCOSE    I&O's Detail    11 May 2018 07:01  -  12 May 2018 05:51  --------------------------------------------------------  IN:    lactated ringers.: 1125 mL  Total IN: 1125 mL    OUT:    Bulb: 52.5 mL    Bulb: 5 mL    Indwelling Catheter - Urethral: 955 mL  Total OUT: 1012.5 mL    Total NET: 112.5 mL      MEDICATIONS  (STANDING):  acetaminophen  IVPB. 1000 milliGRAM(s) IV Intermittent once  cephalexin 500 milliGRAM(s) Oral four times a day  lactated ringers. 1000 milliLiter(s) (125 mL/Hr) IV Continuous <Continuous>  sodium chloride 0.9% lock flush 3 milliLiter(s) IV Push every 8 hours    MEDICATIONS  (PRN):  oxyCODONE    IR 5 milliGRAM(s) Oral every 3 hours PRN Severe Pain (7 - 10)      Physical Exam:  Constitutional: WDWN female, NAD AxOx3  Skin: no breakdowns noted, warm and dry  Face: +ecchymosis noted bilaterally around mouth, some edema noted in same area,   Chest: s1s2+, RRR, +Rhonchi noted on right throughout, clear on left, no w/r/r    Abdomen: soft, nondistended, no guarding, no rebound, soft bowel sounds, appropriate tenderness noted   Incision site:   low transverse incision noted, dressing in place, abdominal binder in place,   Drains: b/l drains patent with serosang fluid noted in bulbs  Extremities: no lower extremity edema or calf tenderness bilaterally; intermittent compression stockings in place     LABS:    5/12 pending results               11.9   13.02 )-----------( 217      ( 05-11 @ 23:32 )             35.5       a/p: This 52y female, s/pExLap, NADEGE, BSO, abdominoplasty, liposuction of flanks, hips, and inner thighs, fat grafting to face - stable     CV: hemodynamically stable, labs pending for this am  PUL: adequate on RA  GI:  tolerating ice chips, advance to clears and if tolerated, to regular diet  : taylor catheter in place, adequate output  ID: afebrile, WBC stable  DVT prophylaxis: Heparin pre-op, no post op PPX until approved by Plastics  Pain Management: IV tylenol and PO Oxycodone  d/w Dr. Morrow on morning rounds  -ambulation as per Plastics - strict guidelines to follow  -encourage incentive spirometry  -Keflex to start as per plastics  -NO NSAIDS until POD#3 as per plastics  continue with current care    NIK Ortega  #[id number] PA GynOnc Progress Note POD #1    Pt seen, examined at bedside and doing well while meeting all post operative milestones. Pt states mild abdominal pain and facial pain.  Pt states she had a cold/cough prior to surgery.  Pt denies fever, chills, chest pain, SOB, nausea, vomiting, lightheadedness, dizziness. Pt has ice packs on b/l facial cheeks.    T(F): 97.7 (05-12-18 @ 00:52), Max: 98.4 (05-11-18 @ 23:00)  HR: 76 (05-12-18 @ 00:52) (66 - 96)  BP: 119/79 (05-12-18 @ 00:52) (109/69 - 132/82)  RR: 18 (05-12-18 @ 00:52) (14 - 26)  SpO2: 99% (05-12-18 @ 00:52) (95% - 100%)    CAPILLARY BLOOD GLUCOSE    I&O's Detail    11 May 2018 07:01  -  12 May 2018 05:51  --------------------------------------------------------  IN:    lactated ringers.: 1125 mL  Total IN: 1125 mL    OUT:    Bulb: 52.5 mL    Bulb: 5 mL    Indwelling Catheter - Urethral: 955 mL  Total OUT: 1012.5 mL    Total NET: 112.5 mL      MEDICATIONS  (STANDING):  acetaminophen  IVPB. 1000 milliGRAM(s) IV Intermittent once  cephalexin 500 milliGRAM(s) Oral four times a day  lactated ringers. 1000 milliLiter(s) (125 mL/Hr) IV Continuous <Continuous>  sodium chloride 0.9% lock flush 3 milliLiter(s) IV Push every 8 hours    MEDICATIONS  (PRN):  oxyCODONE    IR 5 milliGRAM(s) Oral every 3 hours PRN Severe Pain (7 - 10)      Physical Exam:  Constitutional: WDWN female, NAD AxOx3  Skin: no breakdowns noted, warm and dry  Face: +ecchymosis noted bilaterally around mouth, some edema noted in same area,   Chest: s1s2+, RRR, +Rhonchi noted on right throughout, clear on left, no w/r/r    Abdomen: soft, nondistended, no guarding, no rebound, soft bowel sounds, appropriate tenderness noted   Incision site:   low transverse incision noted, dressing in place, abdominal binder in place,   Drains: b/l drains patent with serosang fluid noted in bulbs  Extremities: no lower extremity edema or calf tenderness bilaterally; intermittent compression stockings in place     LABS:    5/12 pending results               11.9   13.02 )-----------( 217      ( 05-11 @ 23:32 )             35.5       a/p: This 52y female, s/p ExLap, NADEGE, BS, abdominoplasty, liposuction of flanks, hips, and inner thighs, fat grafting to face - stable (AWM: I confirmed that a bilateral salpingectomy not a BSO was performed per Dr De Anda's report to me this morning)    CV: hemodynamically stable, labs pending for this am  PUL: adequate on RA  GI:  tolerating ice chips, advance to clears and if tolerated, to regular diet  : taylor catheter in place, adequate output  ID: afebrile, WBC stable  DVT prophylaxis: Heparin pre-op, no post op PPX until approved by Plastics  Pain Management: IV tylenol and PO Oxycodone  d/w Dr. Morrow on morning rounds  -ambulation as per Plastics - strict guidelines to follow  -encourage incentive spirometry  -Keflex to start as per plastics  -NO NSAIDS until POD#3 as per plastics  continue with current care    NIK Ortega  #[id number]

## 2018-05-12 NOTE — PROGRESS NOTE ADULT - SUBJECTIVE AND OBJECTIVE BOX
Patient awake and alert this AM, doing well overall. Reports cough/cold pre-op, complains of abdominal pain with coughing. Otherwise pain controlled. Anxious for OOB today.     Vital Signs Last 24 Hrs  T(C): 37.1 (12 May 2018 05:58), Max: 37.1 (12 May 2018 05:58)  T(F): 98.7 (12 May 2018 05:58), Max: 98.7 (12 May 2018 05:58)  HR: 77 (12 May 2018 05:58) (66 - 96)  BP: 100/67 (12 May 2018 05:58) (100/67 - 132/82)  BP(mean): --  RR: 18 (12 May 2018 05:58) (14 - 26)  SpO2: 97% (12 May 2018 05:58) (95% - 100%)  I&O's Detail    11 May 2018 07:01  -  12 May 2018 07:00  --------------------------------------------------------  IN:    lactated ringers.: 1500 mL  Total IN: 1500 mL    OUT:    Bulb: 52.5 mL    Bulb: 5 mL    Indwelling Catheter - Urethral: 955 mL  Total OUT: 1012.5 mL    Total NET: 487.5 mL      NAD, awake and alert  Expected facial bruising, minimal swelling  Abdominal incision is clean and intact, abdomen flap at soft  no signs of bleeding  drains serosanguinous  abdominal binder in place

## 2018-05-12 NOTE — PROGRESS NOTE ADULT - ASSESSMENT
POD#1 s/p NADEGE, abdominoplasty, fat grafting to face. recovering well  -elevate HOB, Ice  -keep bed in flexed position  -ambulate in flexed position, encourage OOB  -continue abdominal binder  -Abx  -drain care

## 2018-05-13 LAB
BUN SERPL-MCNC: 8 MG/DL — SIGNIFICANT CHANGE UP (ref 7–23)
CALCIUM SERPL-MCNC: 8.1 MG/DL — LOW (ref 8.4–10.5)
CHLORIDE SERPL-SCNC: 99 MMOL/L — SIGNIFICANT CHANGE UP (ref 98–107)
CO2 SERPL-SCNC: 25 MMOL/L — SIGNIFICANT CHANGE UP (ref 22–31)
CREAT SERPL-MCNC: 0.72 MG/DL — SIGNIFICANT CHANGE UP (ref 0.5–1.3)
GLUCOSE SERPL-MCNC: 114 MG/DL — HIGH (ref 70–99)
HCT VFR BLD CALC: 32.9 % — LOW (ref 34.5–45)
HGB BLD-MCNC: 11.2 G/DL — LOW (ref 11.5–15.5)
MAGNESIUM SERPL-MCNC: 2 MG/DL — SIGNIFICANT CHANGE UP (ref 1.6–2.6)
MCHC RBC-ENTMCNC: 31.1 PG — SIGNIFICANT CHANGE UP (ref 27–34)
MCHC RBC-ENTMCNC: 34 % — SIGNIFICANT CHANGE UP (ref 32–36)
MCV RBC AUTO: 91.4 FL — SIGNIFICANT CHANGE UP (ref 80–100)
NRBC # FLD: 0 — SIGNIFICANT CHANGE UP
PHOSPHATE SERPL-MCNC: 1.8 MG/DL — LOW (ref 2.5–4.5)
PLATELET # BLD AUTO: 204 K/UL — SIGNIFICANT CHANGE UP (ref 150–400)
PMV BLD: 10.1 FL — SIGNIFICANT CHANGE UP (ref 7–13)
POTASSIUM SERPL-MCNC: 3.8 MMOL/L — SIGNIFICANT CHANGE UP (ref 3.5–5.3)
POTASSIUM SERPL-SCNC: 3.8 MMOL/L — SIGNIFICANT CHANGE UP (ref 3.5–5.3)
RBC # BLD: 3.6 M/UL — LOW (ref 3.8–5.2)
RBC # FLD: 12.7 % — SIGNIFICANT CHANGE UP (ref 10.3–14.5)
SODIUM SERPL-SCNC: 135 MMOL/L — SIGNIFICANT CHANGE UP (ref 135–145)
WBC # BLD: 9.93 K/UL — SIGNIFICANT CHANGE UP (ref 3.8–10.5)
WBC # FLD AUTO: 9.93 K/UL — SIGNIFICANT CHANGE UP (ref 3.8–10.5)

## 2018-05-13 RX ORDER — DOCUSATE SODIUM 100 MG
100 CAPSULE ORAL
Qty: 0 | Refills: 0 | Status: DISCONTINUED | OUTPATIENT
Start: 2018-05-13 | End: 2018-05-14

## 2018-05-13 RX ORDER — SIMETHICONE 80 MG/1
80 TABLET, CHEWABLE ORAL
Qty: 0 | Refills: 0 | Status: DISCONTINUED | OUTPATIENT
Start: 2018-05-13 | End: 2018-05-14

## 2018-05-13 RX ORDER — OXYCODONE HYDROCHLORIDE 5 MG/1
1 TABLET ORAL
Qty: 40 | Refills: 0 | OUTPATIENT
Start: 2018-05-13 | End: 2018-05-22

## 2018-05-13 RX ORDER — SODIUM CHLORIDE 0.65 %
1 AEROSOL, SPRAY (ML) NASAL EVERY 8 HOURS
Qty: 0 | Refills: 0 | Status: DISCONTINUED | OUTPATIENT
Start: 2018-05-13 | End: 2018-05-13

## 2018-05-13 RX ORDER — ACETAMINOPHEN 500 MG
975 TABLET ORAL EVERY 6 HOURS
Qty: 0 | Refills: 0 | Status: DISCONTINUED | OUTPATIENT
Start: 2018-05-13 | End: 2018-05-14

## 2018-05-13 RX ORDER — CEPHALEXIN 500 MG
1 CAPSULE ORAL
Qty: 28 | Refills: 0 | OUTPATIENT
Start: 2018-05-13 | End: 2018-05-19

## 2018-05-13 RX ORDER — SODIUM,POTASSIUM PHOSPHATES 278-250MG
1 POWDER IN PACKET (EA) ORAL
Qty: 0 | Refills: 0 | Status: COMPLETED | OUTPATIENT
Start: 2018-05-13 | End: 2018-05-13

## 2018-05-13 RX ADMIN — SODIUM CHLORIDE 3 MILLILITER(S): 9 INJECTION INTRAMUSCULAR; INTRAVENOUS; SUBCUTANEOUS at 13:50

## 2018-05-13 RX ADMIN — Medication 500 MILLIGRAM(S): at 18:02

## 2018-05-13 RX ADMIN — OXYCODONE HYDROCHLORIDE 5 MILLIGRAM(S): 5 TABLET ORAL at 09:13

## 2018-05-13 RX ADMIN — SIMETHICONE 80 MILLIGRAM(S): 80 TABLET, CHEWABLE ORAL at 16:21

## 2018-05-13 RX ADMIN — OXYCODONE HYDROCHLORIDE 5 MILLIGRAM(S): 5 TABLET ORAL at 19:37

## 2018-05-13 RX ADMIN — Medication 1 TABLET(S): at 22:10

## 2018-05-13 RX ADMIN — Medication 500 MILLIGRAM(S): at 12:15

## 2018-05-13 RX ADMIN — Medication 975 MILLIGRAM(S): at 18:53

## 2018-05-13 RX ADMIN — Medication 1 TABLET(S): at 17:07

## 2018-05-13 RX ADMIN — OXYCODONE HYDROCHLORIDE 5 MILLIGRAM(S): 5 TABLET ORAL at 10:00

## 2018-05-13 RX ADMIN — SODIUM CHLORIDE 3 MILLILITER(S): 9 INJECTION INTRAMUSCULAR; INTRAVENOUS; SUBCUTANEOUS at 05:00

## 2018-05-13 RX ADMIN — Medication 975 MILLIGRAM(S): at 23:37

## 2018-05-13 RX ADMIN — OXYCODONE HYDROCHLORIDE 5 MILLIGRAM(S): 5 TABLET ORAL at 00:17

## 2018-05-13 RX ADMIN — Medication 975 MILLIGRAM(S): at 18:04

## 2018-05-13 RX ADMIN — SIMETHICONE 80 MILLIGRAM(S): 80 TABLET, CHEWABLE ORAL at 05:00

## 2018-05-13 RX ADMIN — OXYCODONE HYDROCHLORIDE 5 MILLIGRAM(S): 5 TABLET ORAL at 18:53

## 2018-05-13 RX ADMIN — Medication 1 TABLET(S): at 12:15

## 2018-05-13 RX ADMIN — SIMETHICONE 80 MILLIGRAM(S): 80 TABLET, CHEWABLE ORAL at 20:25

## 2018-05-13 RX ADMIN — Medication 500 MILLIGRAM(S): at 05:00

## 2018-05-13 RX ADMIN — Medication 975 MILLIGRAM(S): at 12:15

## 2018-05-13 RX ADMIN — Medication 100 MILLIGRAM(S): at 17:07

## 2018-05-13 RX ADMIN — OXYCODONE HYDROCHLORIDE 5 MILLIGRAM(S): 5 TABLET ORAL at 00:50

## 2018-05-13 RX ADMIN — Medication 500 MILLIGRAM(S): at 23:37

## 2018-05-13 RX ADMIN — SODIUM CHLORIDE 3 MILLILITER(S): 9 INJECTION INTRAMUSCULAR; INTRAVENOUS; SUBCUTANEOUS at 22:10

## 2018-05-13 RX ADMIN — Medication 1 TABLET(S): at 08:38

## 2018-05-13 NOTE — PROGRESS NOTE ADULT - SUBJECTIVE AND OBJECTIVE BOX
Patient awake and alert this AM, doing well overall, didn't sleep well. Complains of abdominal pain with coughing. Otherwise pain controlled.     Vital Signs Last 24 Hrs  T(C): 37.3 (05-13-18 @ 05:00), Max: 37.4 (05-13-18 @ 01:51)  T(F): 99.2 (05-13-18 @ 05:00), Max: 99.4 (05-13-18 @ 01:51)  HR: 68 (05-13-18 @ 05:00) (68 - 92)  BP: 100/66 (05-13-18 @ 05:00) (99/58 - 112/72)  BP(mean): --  RR: 17 (05-13-18 @ 05:00) (17 - 18)  SpO2: 99% (05-13-18 @ 05:00) (98% - 100%)  I&O's Detail    12 May 2018 07:01  -  13 May 2018 07:00  --------------------------------------------------------  IN:    IV PiggyBack: 50 mL    lactated ringers.: 2875 mL  Total IN: 2925 mL    OUT:    Bulb: 15 mL    Bulb: 65 mL    Indwelling Catheter - Urethral: 3350 mL  Total OUT: 3430 mL    Total NET: -505 mL      NAD, awake and alert  Improving facial bruising  Abdominal incision is clean and intact, umbo viable  drains serosanguinous  abdominal binder in place

## 2018-05-13 NOTE — PROGRESS NOTE ADULT - ATTENDING COMMENTS
Seen on rounds with R2 and PA. Plastics note reviewed. Labs and Flow reviewed. Voided after taylor out.   NAD  Agree with plan. Pain management. All Q/A.

## 2018-05-13 NOTE — PROGRESS NOTE ADULT - ASSESSMENT
POD#2 s/p NADEGE, abdominoplasty, fat grafting to face. recovering well  -elevate HOB, Ice  -keep bed in flexed position  -ambulate in flexed position, encourage OOB frequently  -continue abdominal binder  -Abx  -drain care  -ok to dc home from [facility]S perspective; care per gyn

## 2018-05-13 NOTE — PROGRESS NOTE ADULT - SUBJECTIVE AND OBJECTIVE BOX
[facility] Progress Note POD #    Pt seen, examined at bedside and doing well while meeting all post operative milestones. Pt states mild abdominal pain.  Pt denies fever, chills, chest pain, SOB, nausea, vomiting, lightheadedness, dizziness.  Pt states passing flatus, []BM    T(F): 99.2 (05-13-18 @ 05:00), Max: 99.4 (05-13-18 @ 01:51)  HR: 68 (05-13-18 @ 05:00) (68 - 92)  BP: 100/66 (05-13-18 @ 05:00) (99/58 - 112/72)  RR: 17 (05-13-18 @ 05:00) (17 - 18)  SpO2: 99% (05-13-18 @ 05:00) (97% - 100%)  Wt(kg): --  CAPILLARY BLOOD GLUCOSE    I&O's Detail    11 May 2018 07:01  -  12 May 2018 07:00  --------------------------------------------------------  IN:    lactated ringers.: 1500 mL  Total IN: 1500 mL    OUT:    Bulb: 52.5 mL    Bulb: 5 mL    Indwelling Catheter - Urethral: 955 mL  Total OUT: 1012.5 mL    Total NET: 487.5 mL      12 May 2018 07:01  -  13 May 2018 05:34  --------------------------------------------------------  IN:    IV PiggyBack: 50 mL    lactated ringers.: 2875 mL  Total IN: 2925 mL    OUT:    Bulb: 62.5 mL    Bulb: 12.5 mL    Indwelling Catheter - Urethral: 3200 mL  Total OUT: 3275 mL    Total NET: -350 mL          MEDICATIONS  (STANDING):  cephalexin 500 milliGRAM(s) Oral four times a day  lactated ringers. 1000 milliLiter(s) (125 mL/Hr) IV Continuous <Continuous>  sodium chloride 0.9% lock flush 3 milliLiter(s) IV Push every 8 hours    MEDICATIONS  (PRN):  oxyCODONE    IR 5 milliGRAM(s) Oral every 3 hours PRN Severe Pain (7 - 10)  simethicone 80 milliGRAM(s) Chew four times a day PRN Indigestion      Physical Exam:  Constitutional: WDWN female, NAD AxOx3  Skin: no breakdowns noted, warm and dry  Chest: s1s2+, RRR, clear to auscultation bilaterally, no w/r/r    Abdomen: softly distended, no guarding, no rebound, [] bowel sounds, appropriate tenderness noted   Incision site:   vertical incision clean and dry with []intact.    Extremities: no lower extremity edema or calf tenderness bilaterally; intermittent compression stockings in place     LABS:             11.2   9.93  )-----------( 204      ( 05-13 @ 04:27 )             32.9                12.3   13.01 )-----------( 236      ( 05-12 @ 04:50 )             37.2                11.9   13.02 )-----------( 217      ( 05-11 @ 23:32 )             35.5       05-13    135    |  99     |  8      ----------------------------<  114<H>  3.8     |  25     |  0.72   05-12    134<L>  |  95<L>  |  9      ----------------------------<  142<H>  3.8     |  23     |  0.67     Ca    8.1<L>      13 May 2018 04:27  Ca    7.8<L>      12 May 2018 04:50  Phos  1.8       05-13  Phos  3.0       05-12  Mg     2.0       05-13  Mg     1.5       05-12                RADIOLOGY & ADDITIONAL TESTS:    a/p: This 52y female, s/p     CV: hemodynamically stable, H/H []  PUL: adequate on RA  GI:   :  ID: afebrile, WBC stable  DVT prophylaxis:   Pain Management: controlled on []  d/w    -encourage ambulation  -encourage incentive spirometry  -start d/c planning  continue with current care    NIK Ortega  #[id number] [facility] Progress Note POD #2    Pt seen, examined at bedside and doing well while meeting all post operative milestones. Pt complaining of some right thigh discomfort. She states it feels like a "stinging" pain.   Pt denies fever, chills, chest pain, SOB, nausea, vomiting, lightheadedness, dizziness.  Pt denies passing flatus.    T(F): 99.2 (05-13-18 @ 05:00), Max: 99.4 (05-13-18 @ 01:51)  HR: 68 (05-13-18 @ 05:00) (68 - 92)  BP: 100/66 (05-13-18 @ 05:00) (99/58 - 112/72)  RR: 17 (05-13-18 @ 05:00) (17 - 18)  SpO2: 99% (05-13-18 @ 05:00) (97% - 100%)      I&O's Detail    11 May 2018 07:01  -  12 May 2018 07:00  --------------------------------------------------------  IN:    lactated ringers.: 1500 mL  Total IN: 1500 mL    OUT:    Bulb: 52.5 mL    Bulb: 5 mL    Indwelling Catheter - Urethral: 955 mL  Total OUT: 1012.5 mL    Total NET: 487.5 mL      12 May 2018 07:01  -  13 May 2018 05:34  --------------------------------------------------------  IN:    IV PiggyBack: 50 mL    lactated ringers.: 2875 mL  Total IN: 2925 mL    OUT:    Bulb: 62.5 mL    Bulb: 12.5 mL    Indwelling Catheter - Urethral: 3200 mL  Total OUT: 3275 mL    Total NET: -350 mL      MEDICATIONS  (STANDING):  cephalexin 500 milliGRAM(s) Oral four times a day  lactated ringers. 1000 milliLiter(s) (125 mL/Hr) IV Continuous <Continuous>  sodium chloride 0.9% lock flush 3 milliLiter(s) IV Push every 8 hours    MEDICATIONS  (PRN):  oxyCODONE    IR 5 milliGRAM(s) Oral every 3 hours PRN Severe Pain (7 - 10)  simethicone 80 milliGRAM(s) Chew four times a day PRN Indigestion      Physical Exam:  Constitutional: WDWN female, NAD AxOx3  Face: b/l ecchymosis noted along outer region of mouth, (expected as per plastics)  Skin: no breakdowns noted, warm and dry  Chest: s1s2+, RRR, clear to auscultation bilaterally, no w/r/r    Abdomen: soft, nondistended, no guarding, no rebound, + bowel sounds, appropriate tenderness noted   Incision site:   low transverse incision clean and dry and intact. Abdominal binder in place  Right thigh: some erythema noted in crease, no edema noted, slightly warm to touch, no skin breakdowns noted  Left thigh: no edema noted  Drains: b/l subcutaneous drains patent with serosanguinous fluid noted in bulbs    Extremities: no lower extremity edema or calf tenderness bilaterally; intermittent compression stockings in place     LABS:             11.2   9.93  )-----------( 204      ( 05-13 @ 04:27 )             32.9                12.3   13.01 )-----------( 236      ( 05-12 @ 04:50 )             37.2                11.9   13.02 )-----------( 217      ( 05-11 @ 23:32 )             35.5       05-13    135    |  99     |  8      ----------------------------<  114<H>  3.8     |  25     |  0.72   05-12    134<L>  |  95<L>  |  9      ----------------------------<  142<H>  3.8     |  23     |  0.67     Ca    8.1<L>      13 May 2018 04:27  Ca    7.8<L>      12 May 2018 04:50  Phos  1.8       05-13  Phos  3.0       05-12  Mg     2.0       05-13  Mg     1.5       05-12        a/p: This 52y female, s/p  ExLap, NADEGE, BS, abdominoplasty, liposuction of flanks, hips, and inner thighs, fat grafting to face - stable    CV: hemodynamically stable, H/H stable  PUL: adequate on RA  GI: tolerating regular diet, not passing flatus  : taylor catheter removed this am at 6..pt is DTV at 2pm  ID: afebrile, WBC stable  DVT prophylaxis: Heparin pre-op, no post op PPX until approved by Plastics  Pain Management: Tylenol and PO Oxycodone  d/w Dr. Morrow on morning rounds  -ambulation as per Plastics - strict guidelines to follow  -encourage incentive spirometry  -Keflex to start as per plastics  -continue with ice packs to face  -NO NSAIDS until POD#3 as per plastics  continue with current care    NIK Ortega  #[id number] [facility] Progress Note POD #2    Pt seen, examined at bedside and doing well while meeting all post operative milestones. Pt complaining of some right thigh discomfort. She states it feels like a "stinging" pain.   Pt denies fever, chills, chest pain, SOB, nausea, vomiting, lightheadedness, dizziness.  Pt denies passing flatus.    T(F): 99.2 (05-13-18 @ 05:00), Max: 99.4 (05-13-18 @ 01:51)  HR: 68 (05-13-18 @ 05:00) (68 - 92)  BP: 100/66 (05-13-18 @ 05:00) (99/58 - 112/72)  RR: 17 (05-13-18 @ 05:00) (17 - 18)  SpO2: 99% (05-13-18 @ 05:00) (97% - 100%)      I&O's Detail    11 May 2018 07:01  -  12 May 2018 07:00  --------------------------------------------------------  IN:    lactated ringers.: 1500 mL  Total IN: 1500 mL    OUT:    Bulb: 52.5 mL    Bulb: 5 mL    Indwelling Catheter - Urethral: 955 mL  Total OUT: 1012.5 mL    Total NET: 487.5 mL      12 May 2018 07:01  -  13 May 2018 05:34  --------------------------------------------------------  IN:    IV PiggyBack: 50 mL    lactated ringers.: 2875 mL  Total IN: 2925 mL    OUT:    Bulb: 62.5 mL    Bulb: 12.5 mL    Indwelling Catheter - Urethral: 3200 mL  Total OUT: 3275 mL    Total NET: -350 mL      MEDICATIONS  (STANDING):  cephalexin 500 milliGRAM(s) Oral four times a day  lactated ringers. 1000 milliLiter(s) (125 mL/Hr) IV Continuous <Continuous>  sodium chloride 0.9% lock flush 3 milliLiter(s) IV Push every 8 hours    MEDICATIONS  (PRN):  oxyCODONE    IR 5 milliGRAM(s) Oral every 3 hours PRN Severe Pain (7 - 10)  simethicone 80 milliGRAM(s) Chew four times a day PRN Indigestion      Physical Exam:  Constitutional: WDWN female, NAD AxOx3  Face: b/l ecchymosis noted along outer region of mouth, (expected as per plastics)  Skin: no breakdowns noted, warm and dry  Chest: s1s2+, RRR, clear to auscultation bilaterally, no w/r/r    Abdomen: soft, nondistended, no guarding, no rebound, + bowel sounds, appropriate tenderness noted   Incision site:   low transverse incision clean and dry and intact. Abdominal binder in place  Right thigh: some erythema noted in crease, no edema noted, slightly warm to touch, no skin breakdowns noted  Left thigh: no edema noted  Drains: b/l subcutaneous drains patent with serosanguinous fluid noted in bulbs    Extremities: no lower extremity edema or calf tenderness bilaterally; intermittent compression stockings in place     LABS:             11.2   9.93  )-----------( 204      ( 05-13 @ 04:27 )             32.9                12.3   13.01 )-----------( 236      ( 05-12 @ 04:50 )             37.2                11.9   13.02 )-----------( 217      ( 05-11 @ 23:32 )             35.5       05-13    135    |  99     |  8      ----------------------------<  114<H>  3.8     |  25     |  0.72   05-12    134<L>  |  95<L>  |  9      ----------------------------<  142<H>  3.8     |  23     |  0.67     Ca    8.1<L>      13 May 2018 04:27  Ca    7.8<L>      12 May 2018 04:50  Phos  1.8       05-13  Phos  3.0       05-12  Mg     2.0       05-13  Mg     1.5       05-12        a/p: This 52y female, s/p  ExLap, NADEGE, BS, abdominoplasty, liposuction of flanks, hips, and inner thighs, fat grafting to face - stable    CV: hemodynamically stable, H/H stable  PUL: adequate on RA  GI: tolerating regular diet, not passing flatus  : taylor catheter removed this am at 6..pt is DTV at 2pm  ID: afebrile, WBC stable  DVT prophylaxis: Heparin pre-op, no post op PPX until approved by Plastics  Pain Management: Tylenol and PO Oxycodone  d/w Dr. Morrow on morning rounds  -ambulation as per Plastics - strict guidelines to follow  -encourage incentive spirometry  -Keflex started on 5/11 as per plastics-to continue for 10 days  -continue with ice packs to face  -NO NSAIDS until POD#3 as per plastics  continue with current care    NIK Ortega  #[id number]

## 2018-05-14 ENCOUNTER — TRANSCRIPTION ENCOUNTER (OUTPATIENT)
Age: 53
End: 2018-05-14

## 2018-05-14 VITALS
RESPIRATION RATE: 16 BRPM | OXYGEN SATURATION: 95 % | SYSTOLIC BLOOD PRESSURE: 108 MMHG | TEMPERATURE: 98 F | HEART RATE: 94 BPM | DIASTOLIC BLOOD PRESSURE: 66 MMHG

## 2018-05-14 DIAGNOSIS — D06.9 CARCINOMA IN SITU OF CERVIX, UNSPECIFIED: ICD-10-CM

## 2018-05-14 LAB
BUN SERPL-MCNC: 10 MG/DL — SIGNIFICANT CHANGE UP (ref 7–23)
CALCIUM SERPL-MCNC: 8.1 MG/DL — LOW (ref 8.4–10.5)
CHLORIDE SERPL-SCNC: 99 MMOL/L — SIGNIFICANT CHANGE UP (ref 98–107)
CO2 SERPL-SCNC: 26 MMOL/L — SIGNIFICANT CHANGE UP (ref 22–31)
CREAT SERPL-MCNC: 0.62 MG/DL — SIGNIFICANT CHANGE UP (ref 0.5–1.3)
GLUCOSE SERPL-MCNC: 100 MG/DL — HIGH (ref 70–99)
HCT VFR BLD CALC: 31.5 % — LOW (ref 34.5–45)
HGB BLD-MCNC: 10.5 G/DL — LOW (ref 11.5–15.5)
MAGNESIUM SERPL-MCNC: 2 MG/DL — SIGNIFICANT CHANGE UP (ref 1.6–2.6)
MCHC RBC-ENTMCNC: 30.1 PG — SIGNIFICANT CHANGE UP (ref 27–34)
MCHC RBC-ENTMCNC: 33.3 % — SIGNIFICANT CHANGE UP (ref 32–36)
MCV RBC AUTO: 90.3 FL — SIGNIFICANT CHANGE UP (ref 80–100)
NRBC # FLD: 0 — SIGNIFICANT CHANGE UP
PHOSPHATE SERPL-MCNC: 2.5 MG/DL — SIGNIFICANT CHANGE UP (ref 2.5–4.5)
PLATELET # BLD AUTO: 199 K/UL — SIGNIFICANT CHANGE UP (ref 150–400)
PMV BLD: 10.3 FL — SIGNIFICANT CHANGE UP (ref 7–13)
POTASSIUM SERPL-MCNC: 3.4 MMOL/L — LOW (ref 3.5–5.3)
POTASSIUM SERPL-SCNC: 3.4 MMOL/L — LOW (ref 3.5–5.3)
RBC # BLD: 3.49 M/UL — LOW (ref 3.8–5.2)
RBC # FLD: 12.7 % — SIGNIFICANT CHANGE UP (ref 10.3–14.5)
SODIUM SERPL-SCNC: 138 MMOL/L — SIGNIFICANT CHANGE UP (ref 135–145)
WBC # BLD: 7.96 K/UL — SIGNIFICANT CHANGE UP (ref 3.8–10.5)
WBC # FLD AUTO: 7.96 K/UL — SIGNIFICANT CHANGE UP (ref 3.8–10.5)

## 2018-05-14 RX ORDER — POTASSIUM CHLORIDE 20 MEQ
20 PACKET (EA) ORAL ONCE
Qty: 0 | Refills: 0 | Status: COMPLETED | OUTPATIENT
Start: 2018-05-14 | End: 2018-05-14

## 2018-05-14 RX ORDER — ACETAMINOPHEN 500 MG
3 TABLET ORAL
Qty: 0 | Refills: 0 | COMMUNITY
Start: 2018-05-14

## 2018-05-14 RX ADMIN — SODIUM CHLORIDE 3 MILLILITER(S): 9 INJECTION INTRAMUSCULAR; INTRAVENOUS; SUBCUTANEOUS at 06:02

## 2018-05-14 RX ADMIN — SODIUM CHLORIDE 3 MILLILITER(S): 9 INJECTION INTRAMUSCULAR; INTRAVENOUS; SUBCUTANEOUS at 14:31

## 2018-05-14 RX ADMIN — Medication 975 MILLIGRAM(S): at 12:21

## 2018-05-14 RX ADMIN — Medication 500 MILLIGRAM(S): at 06:02

## 2018-05-14 RX ADMIN — SIMETHICONE 80 MILLIGRAM(S): 80 TABLET, CHEWABLE ORAL at 08:56

## 2018-05-14 RX ADMIN — OXYCODONE HYDROCHLORIDE 5 MILLIGRAM(S): 5 TABLET ORAL at 09:40

## 2018-05-14 RX ADMIN — Medication 100 MILLIGRAM(S): at 06:02

## 2018-05-14 RX ADMIN — Medication 100 MILLIGRAM(S): at 11:12

## 2018-05-14 RX ADMIN — Medication 20 MILLIEQUIVALENT(S): at 08:56

## 2018-05-14 RX ADMIN — Medication 975 MILLIGRAM(S): at 06:02

## 2018-05-14 RX ADMIN — OXYCODONE HYDROCHLORIDE 5 MILLIGRAM(S): 5 TABLET ORAL at 08:56

## 2018-05-14 RX ADMIN — Medication 975 MILLIGRAM(S): at 00:30

## 2018-05-14 RX ADMIN — Medication 975 MILLIGRAM(S): at 06:37

## 2018-05-14 RX ADMIN — Medication 975 MILLIGRAM(S): at 13:05

## 2018-05-14 RX ADMIN — Medication 500 MILLIGRAM(S): at 12:22

## 2018-05-14 NOTE — DISCHARGE NOTE ADULT - INSTRUCTIONS
call md office if having temperature above 101,if not tolerating diet ,nauseous ,vomit ting ,if incision site looks red ,swollen or discharge noted ,if having excruciating abdominal pain not relieved by medication

## 2018-05-14 NOTE — DISCHARGE NOTE ADULT - CARE PROVIDERS DIRECT ADDRESSES
[email or web address]

## 2018-05-14 NOTE — DISCHARGE NOTE ADULT - CARE PLAN
Principal Discharge DX:	Postoperative state  Goal:	recovery  Assessment and plan of treatment:	Regular diet.  Resume normal activity as tolerated.  No heavy lifting, driving, or strenuous activity for 6 weeks.  Call your doctor with any signs and symptoms of infection such as fever, chills, nausea or vomiting.  Call your doctor with redness or swelling at the incision site, fluid leakage or wound separation.  Call your doctor if you're unable to tolerate food or have difficulty urinating.  Call your doctor if you have pain that is not relieved by your prescribed medications.  Notify your doctor with any other concerns.  Follow up with Dr. De Anda in 2 weeks.    Plastics recommendations:   Walk hunched over.   No showers (sponge bath only) until follow up.  No NSAID's until follow up in office.  Continue Keflex as prescribed.  follow up with Dr. Monk within one week.

## 2018-05-14 NOTE — PROGRESS NOTE ADULT - SUBJECTIVE AND OBJECTIVE BOX
S: no acute events overnight. no acute events overnight.    O:  Vital Signs Last 24 Hrs  T(C): 37.3 (14 May 2018 06:01), Max: 37.6 (13 May 2018 13:49)  T(F): 99.1 (14 May 2018 06:01), Max: 99.7 (13 May 2018 13:49)  HR: 75 (14 May 2018 06:01) (63 - 90)  BP: 112/67 (14 May 2018 06:01) (93/57 - 112/67)  BP(mean): --  RR: 18 (14 May 2018 06:01) (17 - 18)  SpO2: 100% (14 May 2018 06:01) (95% - 100%)    Gen: NAD  Abdomen: soft, incision c/d/i, no collections, no bleeding

## 2018-05-14 NOTE — DISCHARGE NOTE ADULT - CARE PROVIDER_API CALL
Maribell De Anda), Gynecologic Oncology; Obstetrics and Gynecology  [facility] [facility]  [address] [place] [address]  Phone: [phone]  Fax: [phone]    Terrell Suh), Plastic Surgery  [address] [place] [address]  Phone: [phone]  Fax: [phone]

## 2018-05-14 NOTE — DISCHARGE NOTE ADULT - CONDITIONS AT DISCHARGE
vitals stable ,incison site looks clean and intact , tolerating diet ,voiding without trouble ,iv discontinued ,discharge instructions given pt verbalize understanding instructions ,patient able to empty ,recap and record torie output

## 2018-05-14 NOTE — DISCHARGE NOTE ADULT - PATIENT PORTAL LINK FT
You can access the [facility] Patient Portal, offered by [facility], by registering with the following website: [email or web address]

## 2018-05-14 NOTE — PROGRESS NOTE ADULT - ASSESSMENT
52y POD#3 s/p  ExLap, NADEGE, BS, abdominoplasty, liposuction of flanks, hips, and inner thighs, fat grafting to face  Patient is postoperatively stable, meeting milestones

## 2018-05-14 NOTE — PROGRESS NOTE ADULT - PROBLEM SELECTOR PLAN 1
CV: Hemodynamically stable  Pulm: Saturating well on RA. Increase incentive spirometry.  ID: afebrile, no sx of infection. Continue Keflex prophylaxis per PRS  GI: tolerating regular diet  : voiding freely, no issues  Heme: CBC stable. S/p preop Heparin, avoiding anticoag per PRS team. Venodynes for DVT ppx  Neuro: continue Tylenol, Oxy for pain  Dispo: anticipate dc home today    JOSE Mancuso, PGY2

## 2018-05-14 NOTE — DISCHARGE NOTE ADULT - MEDICATION SUMMARY - MEDICATIONS TO TAKE
I will START or STAY ON the medications listed below when I get home from the hospital:    herbal supplements  -- Pt takes takes multiple supplemets [ 13 ] change on a monthly basis   -- Indication: For Home med    Latvian black radish  -- 1 tab(s)  once a day  -- Indication: For Home med    oxyCODONE 5 mg oral tablet  -- 1 tab(s) by mouth every 6 hours, As Needed MDD:4  -- Caution federal law prohibits the transfer of this drug to any person other  than the person for whom it was prescribed.  It is very important that you take or use this exactly as directed.  Do not skip doses or discontinue unless directed by your doctor.  May cause drowsiness.  Alcohol may intensify this effect.  Use care when operating dangerous machinery.  This prescription cannot be refilled.  Using more of this medication than prescribed may cause serious breathing problems.    -- Indication: For Pain    acetaminophen 325 mg oral tablet  -- 3 tab(s) by mouth every 6 hours  -- Indication: For Pain    Keflex 500 mg oral capsule  -- 1 cap(s) by mouth 4 times a day   -- Finish all this medication unless otherwise directed by prescriber.    -- Indication: For Antibiotic per plastics    Fish Oil oral capsule  -- 1 tab(s) by mouth once a day  -- Indication: For Home med

## 2018-05-14 NOTE — DISCHARGE NOTE ADULT - HOSPITAL COURSE
51 y/o s/p  EX-lap, NADEGE, BS, abdominoplasty, liposuction of flanks, hips, and inner thighs, fat grafting to face.  See operative note for details of procedure.  POD#0, patient was advanced to a regular diet.  POD#1, taylor was discontinued and patient voided spontaneously.  Patient was discharged on POD#3 in stable condition with adequate pain control, ambulating, tolerating PO and voiding spontaneously.  Patient to follow up with Dr. De Anda   in 2 weeks.   To follow up with Dr. Monk within one week.

## 2018-05-14 NOTE — PROGRESS NOTE ADULT - ASSESSMENT
A/P: 52F s/p NADEGE and abdominoplasty POD3. Doing well.     -ambulate as tolerated in bent forward position  -pain control, may continue oxycodone at home  -continue keflex QID for 4 more days after discharge  -discharge per GYN, likely today

## 2018-05-14 NOTE — DISCHARGE NOTE ADULT - PLAN OF CARE
Regular diet.  Resume normal activity as tolerated.  No heavy lifting, driving, or strenuous activity for 6 weeks.  Call your doctor with any signs and symptoms of infection such as fever, chills, nausea or vomiting.  Call your doctor with redness or swelling at the incision site, fluid leakage or wound separation.  Call your doctor if you're unable to tolerate food or have difficulty urinating.  Call your doctor if you have pain that is not relieved by your prescribed medications.  Notify your doctor with any other concerns.  Follow up with Dr. De Anda in 2 weeks.    Plastics recommendations:   Walk hunched over.   No showers (sponge bath only) until follow up.  No NSAID's until follow up in office.  Continue Keflex as prescribed.  follow up with Dr. Monk within one week. recovery

## 2018-05-31 ENCOUNTER — APPOINTMENT (OUTPATIENT)
Dept: GYNECOLOGIC ONCOLOGY | Facility: CLINIC | Age: 53
End: 2018-05-31
Payer: MEDICAID

## 2018-05-31 VITALS
BODY MASS INDEX: 21.53 KG/M2 | DIASTOLIC BLOOD PRESSURE: 59 MMHG | TEMPERATURE: 99.2 F | HEIGHT: 62 IN | SYSTOLIC BLOOD PRESSURE: 91 MMHG | WEIGHT: 117 LBS | HEART RATE: 67 BPM

## 2018-05-31 LAB — SURGICAL PATHOLOGY STUDY: SIGNIFICANT CHANGE UP

## 2018-05-31 PROCEDURE — 99024 POSTOP FOLLOW-UP VISIT: CPT

## 2018-06-04 ENCOUNTER — APPOINTMENT (OUTPATIENT)
Dept: GYNECOLOGIC ONCOLOGY | Facility: CLINIC | Age: 53
End: 2018-06-04
Payer: MEDICAID

## 2018-06-04 VITALS
TEMPERATURE: 98.1 F | BODY MASS INDEX: 21.53 KG/M2 | DIASTOLIC BLOOD PRESSURE: 63 MMHG | SYSTOLIC BLOOD PRESSURE: 97 MMHG | HEIGHT: 62 IN | WEIGHT: 117 LBS | HEART RATE: 87 BPM

## 2018-06-04 PROCEDURE — 99024 POSTOP FOLLOW-UP VISIT: CPT

## 2018-06-07 ENCOUNTER — OUTPATIENT (OUTPATIENT)
Dept: OUTPATIENT SERVICES | Facility: HOSPITAL | Age: 53
LOS: 1 days | Discharge: HOME | End: 2018-06-07
Payer: MEDICAID

## 2018-06-07 DIAGNOSIS — Z98.890 OTHER SPECIFIED POSTPROCEDURAL STATES: Chronic | ICD-10-CM

## 2018-06-07 DIAGNOSIS — M21.611 BUNION OF RIGHT FOOT: Chronic | ICD-10-CM

## 2018-06-07 DIAGNOSIS — Z98.891 HISTORY OF UTERINE SCAR FROM PREVIOUS SURGERY: Chronic | ICD-10-CM

## 2018-06-07 DIAGNOSIS — M21.612 BUNION OF LEFT FOOT: Chronic | ICD-10-CM

## 2018-06-07 DIAGNOSIS — Z98.82 BREAST IMPLANT STATUS: Chronic | ICD-10-CM

## 2018-06-13 ENCOUNTER — APPOINTMENT (OUTPATIENT)
Dept: NUCLEAR MEDICINE | Facility: IMAGING CENTER | Age: 53
End: 2018-06-13
Payer: MEDICAID

## 2018-06-13 ENCOUNTER — OUTPATIENT (OUTPATIENT)
Dept: OUTPATIENT SERVICES | Facility: HOSPITAL | Age: 53
LOS: 1 days | End: 2018-06-13
Payer: MEDICAID

## 2018-06-13 ENCOUNTER — APPOINTMENT (OUTPATIENT)
Dept: RADIATION ONCOLOGY | Facility: CLINIC | Age: 53
End: 2018-06-13
Payer: MEDICAID

## 2018-06-13 VITALS
BODY MASS INDEX: 21.4 KG/M2 | SYSTOLIC BLOOD PRESSURE: 117 MMHG | DIASTOLIC BLOOD PRESSURE: 78 MMHG | WEIGHT: 117 LBS | RESPIRATION RATE: 18 BRPM | HEART RATE: 77 BPM

## 2018-06-13 DIAGNOSIS — Z98.891 HISTORY OF UTERINE SCAR FROM PREVIOUS SURGERY: Chronic | ICD-10-CM

## 2018-06-13 DIAGNOSIS — M21.611 BUNION OF RIGHT FOOT: Chronic | ICD-10-CM

## 2018-06-13 DIAGNOSIS — M21.612 BUNION OF LEFT FOOT: Chronic | ICD-10-CM

## 2018-06-13 DIAGNOSIS — Z98.890 OTHER SPECIFIED POSTPROCEDURAL STATES: Chronic | ICD-10-CM

## 2018-06-13 DIAGNOSIS — C53.9 MALIGNANT NEOPLASM OF CERVIX UTERI, UNSPECIFIED: ICD-10-CM

## 2018-06-13 DIAGNOSIS — Z98.82 BREAST IMPLANT STATUS: Chronic | ICD-10-CM

## 2018-06-13 PROCEDURE — 99203 OFFICE O/P NEW LOW 30 MIN: CPT | Mod: GC

## 2018-06-13 PROCEDURE — 78815 PET IMAGE W/CT SKULL-THIGH: CPT

## 2018-06-13 PROCEDURE — 78815 PET IMAGE W/CT SKULL-THIGH: CPT | Mod: 26,PS

## 2018-06-13 PROCEDURE — A9552: CPT

## 2018-06-20 ENCOUNTER — OUTPATIENT (OUTPATIENT)
Dept: OUTPATIENT SERVICES | Facility: HOSPITAL | Age: 53
LOS: 1 days | Discharge: HOME | End: 2018-06-20

## 2018-06-20 DIAGNOSIS — M21.612 BUNION OF LEFT FOOT: Chronic | ICD-10-CM

## 2018-06-20 DIAGNOSIS — M21.611 BUNION OF RIGHT FOOT: Chronic | ICD-10-CM

## 2018-06-20 DIAGNOSIS — Z98.890 OTHER SPECIFIED POSTPROCEDURAL STATES: Chronic | ICD-10-CM

## 2018-06-20 DIAGNOSIS — Z98.82 BREAST IMPLANT STATUS: Chronic | ICD-10-CM

## 2018-06-20 DIAGNOSIS — C53.9 MALIGNANT NEOPLASM OF CERVIX UTERI, UNSPECIFIED: ICD-10-CM

## 2018-06-20 DIAGNOSIS — Z98.891 HISTORY OF UTERINE SCAR FROM PREVIOUS SURGERY: Chronic | ICD-10-CM

## 2018-06-22 ENCOUNTER — APPOINTMENT (OUTPATIENT)
Dept: HEMATOLOGY ONCOLOGY | Facility: CLINIC | Age: 53
End: 2018-06-22

## 2018-07-02 ENCOUNTER — APPOINTMENT (OUTPATIENT)
Dept: GYNECOLOGIC ONCOLOGY | Facility: CLINIC | Age: 53
End: 2018-07-02
Payer: MEDICAID

## 2018-07-02 VITALS
WEIGHT: 121 LBS | DIASTOLIC BLOOD PRESSURE: 59 MMHG | HEIGHT: 62 IN | BODY MASS INDEX: 22.26 KG/M2 | TEMPERATURE: 98.8 F | HEART RATE: 69 BPM | SYSTOLIC BLOOD PRESSURE: 100 MMHG

## 2018-07-02 DIAGNOSIS — R59.0 LOCALIZED ENLARGED LYMPH NODES: ICD-10-CM

## 2018-07-02 PROCEDURE — 99024 POSTOP FOLLOW-UP VISIT: CPT

## 2018-07-02 RX ORDER — ALBUTEROL SULFATE 90 UG/1
108 (90 BASE) AEROSOL, METERED RESPIRATORY (INHALATION)
Qty: 18 | Refills: 0 | Status: ACTIVE | COMMUNITY
Start: 2018-05-17

## 2018-07-09 ENCOUNTER — APPOINTMENT (OUTPATIENT)
Dept: HEMATOLOGY ONCOLOGY | Facility: CLINIC | Age: 53
End: 2018-07-09

## 2018-07-13 ENCOUNTER — APPOINTMENT (OUTPATIENT)
Dept: PLASTIC SURGERY | Facility: CLINIC | Age: 53
End: 2018-07-13
Payer: MEDICAID

## 2018-07-13 PROCEDURE — 99199 UNLISTED SPECIAL SVC PX/RPRT: CPT | Mod: NC

## 2018-07-17 PROBLEM — M25.511 PAIN IN RIGHT SHOULDER: Chronic | Status: ACTIVE | Noted: 2018-03-26

## 2018-08-28 PROBLEM — K58.9 IRRITABLE BOWEL SYNDROME WITHOUT DIARRHEA: Chronic | Status: ACTIVE | Noted: 2018-03-26

## 2018-08-28 PROBLEM — K21.9 GASTRO-ESOPHAGEAL REFLUX DISEASE WITHOUT ESOPHAGITIS: Chronic | Status: ACTIVE | Noted: 2018-03-26

## 2018-08-28 PROBLEM — B97.7 PAPILLOMAVIRUS AS THE CAUSE OF DISEASES CLASSIFIED ELSEWHERE: Chronic | Status: ACTIVE | Noted: 2018-03-26

## 2018-08-28 PROBLEM — C53.9 MALIGNANT NEOPLASM OF CERVIX UTERI, UNSPECIFIED: Chronic | Status: ACTIVE | Noted: 2018-05-08

## 2018-08-28 PROBLEM — M25.561 PAIN IN RIGHT KNEE: Chronic | Status: ACTIVE | Noted: 2018-03-26

## 2018-08-30 ENCOUNTER — APPOINTMENT (OUTPATIENT)
Dept: RADIATION ONCOLOGY | Facility: CLINIC | Age: 53
End: 2018-08-30
Payer: MEDICAID

## 2018-08-30 PROCEDURE — 99214 OFFICE O/P EST MOD 30 MIN: CPT

## 2018-09-10 ENCOUNTER — OUTPATIENT (OUTPATIENT)
Dept: OUTPATIENT SERVICES | Facility: HOSPITAL | Age: 53
LOS: 1 days | Discharge: HOME | End: 2018-09-10

## 2018-09-10 DIAGNOSIS — C53.9 MALIGNANT NEOPLASM OF CERVIX UTERI, UNSPECIFIED: ICD-10-CM

## 2018-09-10 DIAGNOSIS — M21.612 BUNION OF LEFT FOOT: Chronic | ICD-10-CM

## 2018-09-10 DIAGNOSIS — Z98.890 OTHER SPECIFIED POSTPROCEDURAL STATES: Chronic | ICD-10-CM

## 2018-09-10 DIAGNOSIS — M21.611 BUNION OF RIGHT FOOT: Chronic | ICD-10-CM

## 2018-09-10 DIAGNOSIS — Z98.891 HISTORY OF UTERINE SCAR FROM PREVIOUS SURGERY: Chronic | ICD-10-CM

## 2018-09-10 DIAGNOSIS — Z98.82 BREAST IMPLANT STATUS: Chronic | ICD-10-CM

## 2018-09-17 ENCOUNTER — APPOINTMENT (OUTPATIENT)
Dept: HEMATOLOGY ONCOLOGY | Facility: CLINIC | Age: 53
End: 2018-09-17
Payer: MEDICAID

## 2018-09-17 DIAGNOSIS — Z11.59 ENCOUNTER FOR SCREENING FOR OTHER VIRAL DISEASES: ICD-10-CM

## 2018-09-17 PROCEDURE — 99205 OFFICE O/P NEW HI 60 MIN: CPT

## 2018-09-17 RX ORDER — CEPHALEXIN 500 MG/1
500 CAPSULE ORAL
Qty: 28 | Refills: 0 | Status: DISCONTINUED | COMMUNITY
Start: 2018-05-13 | End: 2018-09-17

## 2018-09-17 RX ORDER — BENZONATATE 100 MG/1
100 CAPSULE ORAL
Qty: 30 | Refills: 0 | Status: DISCONTINUED | COMMUNITY
Start: 2018-05-17 | End: 2018-09-17

## 2018-09-17 RX ORDER — OXYCODONE 5 MG/1
5 TABLET ORAL
Qty: 40 | Refills: 0 | Status: DISCONTINUED | COMMUNITY
Start: 2018-05-13 | End: 2018-09-17

## 2018-09-17 RX ORDER — FLUCONAZOLE 100 MG/1
100 TABLET ORAL
Qty: 7 | Refills: 0 | Status: DISCONTINUED | COMMUNITY
Start: 2017-09-13 | End: 2018-09-17

## 2018-09-19 ENCOUNTER — APPOINTMENT (OUTPATIENT)
Dept: HEMATOLOGY ONCOLOGY | Facility: CLINIC | Age: 53
End: 2018-09-19

## 2018-09-19 ENCOUNTER — RESULT REVIEW (OUTPATIENT)
Age: 53
End: 2018-09-19

## 2018-09-19 LAB
HCT VFR BLD CALC: 46.5 % — HIGH (ref 34.5–45)
HGB BLD-MCNC: 15.4 G/DL — SIGNIFICANT CHANGE UP (ref 11.5–15.5)
MCHC RBC-ENTMCNC: 29.2 PG — SIGNIFICANT CHANGE UP (ref 27–34)
MCHC RBC-ENTMCNC: 33.1 G/DL — SIGNIFICANT CHANGE UP (ref 32–36)
MCV RBC AUTO: 88 FL — SIGNIFICANT CHANGE UP (ref 80–100)
PLATELET # BLD AUTO: 218 K/UL — SIGNIFICANT CHANGE UP (ref 150–400)
RBC # BLD: 5.28 M/UL — HIGH (ref 3.8–5.2)
RBC # FLD: 12.2 % — SIGNIFICANT CHANGE UP (ref 10.3–14.5)
WBC # BLD: 5.4 K/UL — SIGNIFICANT CHANGE UP (ref 3.8–10.5)
WBC # FLD AUTO: 5.4 K/UL — SIGNIFICANT CHANGE UP (ref 3.8–10.5)

## 2018-09-20 LAB
ALBUMIN SERPL ELPH-MCNC: 4.6 G/DL
ALP BLD-CCNC: 77 U/L
ALT SERPL-CCNC: 15 U/L
ANION GAP SERPL CALC-SCNC: 13 MMOL/L
AST SERPL-CCNC: 19 U/L
BILIRUB SERPL-MCNC: 0.2 MG/DL
BUN SERPL-MCNC: 17 MG/DL
CALCIUM SERPL-MCNC: 9.5 MG/DL
CEA SERPL-MCNC: 1.4 NG/ML
CHLORIDE SERPL-SCNC: 102 MMOL/L
CO2 SERPL-SCNC: 24 MMOL/L
CREAT SERPL-MCNC: 0.76 MG/DL
GLUCOSE SERPL-MCNC: 90 MG/DL
HAV IGM SER QL: NONREACTIVE
HBV CORE IGG+IGM SER QL: NONREACTIVE
HBV CORE IGM SER QL: NONREACTIVE
HBV SURFACE AB SER QL: REACTIVE
HBV SURFACE AG SER QL: NONREACTIVE
HCV AB SER QL: NONREACTIVE
HCV S/CO RATIO: 0.09 S/CO
POTASSIUM SERPL-SCNC: 4 MMOL/L
PROT SERPL-MCNC: 6.9 G/DL
SODIUM SERPL-SCNC: 139 MMOL/L

## 2018-09-25 PROCEDURE — 77301 RADIOTHERAPY DOSE PLAN IMRT: CPT | Mod: 26

## 2018-09-25 PROCEDURE — 77338 DESIGN MLC DEVICE FOR IMRT: CPT | Mod: 26

## 2018-09-25 PROCEDURE — 77300 RADIATION THERAPY DOSE PLAN: CPT | Mod: 26

## 2018-09-27 PROCEDURE — G6002: CPT | Mod: 26

## 2018-09-28 PROCEDURE — G6002: CPT | Mod: 26

## 2018-10-01 PROCEDURE — G6002: CPT | Mod: 26

## 2018-10-02 PROCEDURE — G6002: CPT | Mod: 26

## 2018-10-03 ENCOUNTER — RESULT REVIEW (OUTPATIENT)
Age: 53
End: 2018-10-03

## 2018-10-03 ENCOUNTER — APPOINTMENT (OUTPATIENT)
Dept: INFUSION THERAPY | Facility: HOSPITAL | Age: 53
End: 2018-10-03

## 2018-10-03 LAB
HCT VFR BLD CALC: 41.4 % — SIGNIFICANT CHANGE UP (ref 34.5–45)
HGB BLD-MCNC: 14.6 G/DL — SIGNIFICANT CHANGE UP (ref 11.5–15.5)
MCHC RBC-ENTMCNC: 30.2 PG — SIGNIFICANT CHANGE UP (ref 27–34)
MCHC RBC-ENTMCNC: 35.2 G/DL — SIGNIFICANT CHANGE UP (ref 32–36)
MCV RBC AUTO: 85.9 FL — SIGNIFICANT CHANGE UP (ref 80–100)
PLATELET # BLD AUTO: 206 K/UL — SIGNIFICANT CHANGE UP (ref 150–400)
RBC # BLD: 4.83 M/UL — SIGNIFICANT CHANGE UP (ref 3.8–5.2)
RBC # FLD: 12 % — SIGNIFICANT CHANGE UP (ref 10.3–14.5)
WBC # BLD: 4.5 K/UL — SIGNIFICANT CHANGE UP (ref 3.8–10.5)
WBC # FLD AUTO: 4.5 K/UL — SIGNIFICANT CHANGE UP (ref 3.8–10.5)

## 2018-10-03 PROCEDURE — G6002: CPT | Mod: 26

## 2018-10-04 DIAGNOSIS — E86.0 DEHYDRATION: ICD-10-CM

## 2018-10-04 DIAGNOSIS — Z51.11 ENCOUNTER FOR ANTINEOPLASTIC CHEMOTHERAPY: ICD-10-CM

## 2018-10-04 DIAGNOSIS — R11.2 NAUSEA WITH VOMITING, UNSPECIFIED: ICD-10-CM

## 2018-10-04 PROCEDURE — G6002: CPT | Mod: 26

## 2018-10-05 PROCEDURE — G6002: CPT | Mod: 26

## 2018-10-08 ENCOUNTER — APPOINTMENT (OUTPATIENT)
Dept: GYNECOLOGIC ONCOLOGY | Facility: CLINIC | Age: 53
End: 2018-10-08
Payer: MEDICAID

## 2018-10-08 VITALS
BODY MASS INDEX: 22.63 KG/M2 | HEART RATE: 62 BPM | HEIGHT: 62 IN | WEIGHT: 123 LBS | SYSTOLIC BLOOD PRESSURE: 90 MMHG | DIASTOLIC BLOOD PRESSURE: 58 MMHG

## 2018-10-08 PROCEDURE — G6002: CPT | Mod: 26

## 2018-10-08 PROCEDURE — 99214 OFFICE O/P EST MOD 30 MIN: CPT

## 2018-10-09 PROCEDURE — 77427 RADIATION TX MANAGEMENT X5: CPT

## 2018-10-09 PROCEDURE — G6002: CPT | Mod: 26

## 2018-10-09 NOTE — VITALS
[Maximal Pain Intensity: 5/10] : 5/10 [Least Pain Intensity: 1/10] : 1/10 [80: Normal activity with effort; some signs or symptoms of disease.] : 80: Normal activity with effort; some signs or symptoms of disease.

## 2018-10-10 ENCOUNTER — APPOINTMENT (OUTPATIENT)
Dept: HEMATOLOGY ONCOLOGY | Facility: CLINIC | Age: 53
End: 2018-10-10

## 2018-10-10 ENCOUNTER — RESULT REVIEW (OUTPATIENT)
Age: 53
End: 2018-10-10

## 2018-10-10 ENCOUNTER — LABORATORY RESULT (OUTPATIENT)
Age: 53
End: 2018-10-10

## 2018-10-10 ENCOUNTER — APPOINTMENT (OUTPATIENT)
Dept: INFUSION THERAPY | Facility: HOSPITAL | Age: 53
End: 2018-10-10

## 2018-10-10 ENCOUNTER — OTHER (OUTPATIENT)
Age: 53
End: 2018-10-10

## 2018-10-10 ENCOUNTER — APPOINTMENT (OUTPATIENT)
Dept: HEMATOLOGY ONCOLOGY | Facility: CLINIC | Age: 53
End: 2018-10-10
Payer: MEDICAID

## 2018-10-10 DIAGNOSIS — Z79.899 OTHER LONG TERM (CURRENT) DRUG THERAPY: ICD-10-CM

## 2018-10-10 LAB
HCT VFR BLD CALC: 40.1 % — SIGNIFICANT CHANGE UP (ref 34.5–45)
HGB BLD-MCNC: 14.3 G/DL — SIGNIFICANT CHANGE UP (ref 11.5–15.5)
MCHC RBC-ENTMCNC: 30.3 PG — SIGNIFICANT CHANGE UP (ref 27–34)
MCHC RBC-ENTMCNC: 35.6 G/DL — SIGNIFICANT CHANGE UP (ref 32–36)
MCV RBC AUTO: 85.3 FL — SIGNIFICANT CHANGE UP (ref 80–100)
PLATELET # BLD AUTO: 147 K/UL — LOW (ref 150–400)
RBC # BLD: 4.7 M/UL — SIGNIFICANT CHANGE UP (ref 3.8–5.2)
RBC # FLD: 12 % — SIGNIFICANT CHANGE UP (ref 10.3–14.5)
WBC # BLD: 4.2 K/UL — SIGNIFICANT CHANGE UP (ref 3.8–10.5)
WBC # FLD AUTO: 4.2 K/UL — SIGNIFICANT CHANGE UP (ref 3.8–10.5)

## 2018-10-10 PROCEDURE — 77014: CPT | Mod: 26

## 2018-10-10 PROCEDURE — 99214 OFFICE O/P EST MOD 30 MIN: CPT

## 2018-10-11 ENCOUNTER — OUTPATIENT (OUTPATIENT)
Dept: OUTPATIENT SERVICES | Facility: HOSPITAL | Age: 53
LOS: 1 days | Discharge: HOME | End: 2018-10-11

## 2018-10-11 DIAGNOSIS — M21.612 BUNION OF LEFT FOOT: Chronic | ICD-10-CM

## 2018-10-11 DIAGNOSIS — M21.611 BUNION OF RIGHT FOOT: Chronic | ICD-10-CM

## 2018-10-11 DIAGNOSIS — Z98.890 OTHER SPECIFIED POSTPROCEDURAL STATES: Chronic | ICD-10-CM

## 2018-10-11 DIAGNOSIS — Z98.82 BREAST IMPLANT STATUS: Chronic | ICD-10-CM

## 2018-10-11 DIAGNOSIS — Z98.891 HISTORY OF UTERINE SCAR FROM PREVIOUS SURGERY: Chronic | ICD-10-CM

## 2018-10-11 DIAGNOSIS — C53.9 MALIGNANT NEOPLASM OF CERVIX UTERI, UNSPECIFIED: ICD-10-CM

## 2018-10-11 PROCEDURE — G6002: CPT | Mod: 26

## 2018-10-12 PROCEDURE — G6002: CPT | Mod: 26

## 2018-10-13 PROBLEM — Z79.899 ON ANTINEOPLASTIC CHEMOTHERAPY: Status: ACTIVE | Noted: 2018-10-13

## 2018-10-15 PROCEDURE — G6002: CPT | Mod: 26

## 2018-10-16 PROCEDURE — G6002: CPT | Mod: 26

## 2018-10-16 NOTE — DISEASE MANAGEMENT
[IB] : IB [Pathological] : TNM Stage: p [TTNM] : 1b1 [NTNM] : x [MTNM] : 0 [de-identified] : [id number] [[place] [address]] : 7708

## 2018-10-17 ENCOUNTER — LABORATORY RESULT (OUTPATIENT)
Age: 53
End: 2018-10-17

## 2018-10-17 ENCOUNTER — APPOINTMENT (OUTPATIENT)
Dept: INFUSION THERAPY | Facility: HOSPITAL | Age: 53
End: 2018-10-17

## 2018-10-17 ENCOUNTER — RESULT REVIEW (OUTPATIENT)
Age: 53
End: 2018-10-17

## 2018-10-17 LAB
HCT VFR BLD CALC: 37.8 % — SIGNIFICANT CHANGE UP (ref 34.5–45)
HGB BLD-MCNC: 13.2 G/DL — SIGNIFICANT CHANGE UP (ref 11.5–15.5)
MCHC RBC-ENTMCNC: 30 PG — SIGNIFICANT CHANGE UP (ref 27–34)
MCHC RBC-ENTMCNC: 35 G/DL — SIGNIFICANT CHANGE UP (ref 32–36)
MCV RBC AUTO: 85.7 FL — SIGNIFICANT CHANGE UP (ref 80–100)
PLATELET # BLD AUTO: 119 K/UL — LOW (ref 150–400)
RBC # BLD: 4.4 M/UL — SIGNIFICANT CHANGE UP (ref 3.8–5.2)
RBC # FLD: 12.3 % — SIGNIFICANT CHANGE UP (ref 10.3–14.5)
WBC # BLD: 4.5 K/UL — SIGNIFICANT CHANGE UP (ref 3.8–10.5)
WBC # FLD AUTO: 4.5 K/UL — SIGNIFICANT CHANGE UP (ref 3.8–10.5)

## 2018-10-17 PROCEDURE — 77014: CPT | Mod: 26

## 2018-10-17 RX ORDER — OMEGA-3 ACID ETHYL ESTERS 1 G
1 CAPSULE ORAL
Qty: 0 | Refills: 0 | COMMUNITY

## 2018-10-18 DIAGNOSIS — R11.2 NAUSEA WITH VOMITING, UNSPECIFIED: ICD-10-CM

## 2018-10-18 DIAGNOSIS — E86.0 DEHYDRATION: ICD-10-CM

## 2018-10-18 DIAGNOSIS — Z51.11 ENCOUNTER FOR ANTINEOPLASTIC CHEMOTHERAPY: ICD-10-CM

## 2018-10-18 PROCEDURE — G6002: CPT | Mod: 26

## 2018-10-19 PROCEDURE — G6002: CPT | Mod: 26

## 2018-10-22 PROCEDURE — G6002: CPT | Mod: 26

## 2018-10-23 PROCEDURE — G6002: CPT | Mod: 26

## 2018-10-24 ENCOUNTER — APPOINTMENT (OUTPATIENT)
Dept: INFUSION THERAPY | Facility: HOSPITAL | Age: 53
End: 2018-10-24

## 2018-10-24 ENCOUNTER — LABORATORY RESULT (OUTPATIENT)
Age: 53
End: 2018-10-24

## 2018-10-24 VITALS
RESPIRATION RATE: 18 BRPM | SYSTOLIC BLOOD PRESSURE: 110 MMHG | OXYGEN SATURATION: 100 % | DIASTOLIC BLOOD PRESSURE: 77 MMHG | HEART RATE: 65 BPM

## 2018-10-24 VITALS — WEIGHT: 127.98 LBS | BODY MASS INDEX: 23.41 KG/M2

## 2018-10-24 PROCEDURE — 77427 RADIATION TX MANAGEMENT X5: CPT

## 2018-10-24 PROCEDURE — G6002: CPT | Mod: 26

## 2018-10-25 PROCEDURE — 77014: CPT | Mod: 26

## 2018-10-25 RX ORDER — TERCONAZOLE 8 MG/G
0.8 CREAM VAGINAL
Qty: 1 | Refills: 0 | Status: DISCONTINUED | COMMUNITY
Start: 2018-10-24 | End: 2018-10-25

## 2018-10-25 NOTE — DISEASE MANAGEMENT
[Pathological] : TNM Stage: p [N/A] : Currently not applicable [TTNM] : 1b1 [NTNM] : x [MTNM] : 0 [de-identified] : 5106 [[place] [address]] : 4792

## 2018-10-25 NOTE — HISTORY OF PRESENT ILLNESS
[FreeTextEntry1] : s/p 20/25 fxns\par She is complaining of irritation of the skin around her vagina\par She is very upset in general. She is worried about having difficulty with orgasms.\par Notes burning when the urine touches her vagina.  had diarrhea a few weeks ago, was relieved with immodium.\par reports minimal discharge

## 2018-10-26 ENCOUNTER — APPOINTMENT (OUTPATIENT)
Dept: PLASTIC SURGERY | Facility: CLINIC | Age: 53
End: 2018-10-26

## 2018-10-26 PROCEDURE — G6002: CPT | Mod: 26

## 2018-10-29 PROCEDURE — G6002: CPT | Mod: 26

## 2018-10-30 PROCEDURE — G6002: CPT | Mod: 26

## 2018-10-31 ENCOUNTER — RESULT REVIEW (OUTPATIENT)
Age: 53
End: 2018-10-31

## 2018-10-31 ENCOUNTER — APPOINTMENT (OUTPATIENT)
Dept: INFUSION THERAPY | Facility: HOSPITAL | Age: 53
End: 2018-10-31

## 2018-10-31 ENCOUNTER — LABORATORY RESULT (OUTPATIENT)
Age: 53
End: 2018-10-31

## 2018-10-31 ENCOUNTER — APPOINTMENT (OUTPATIENT)
Dept: HEMATOLOGY ONCOLOGY | Facility: CLINIC | Age: 53
End: 2018-10-31
Payer: MEDICAID

## 2018-10-31 VITALS
HEART RATE: 85 BPM | SYSTOLIC BLOOD PRESSURE: 122 MMHG | RESPIRATION RATE: 18 BRPM | OXYGEN SATURATION: 100 % | DIASTOLIC BLOOD PRESSURE: 81 MMHG

## 2018-10-31 DIAGNOSIS — R19.7 DIARRHEA, UNSPECIFIED: ICD-10-CM

## 2018-10-31 LAB
BASOPHILS # BLD AUTO: 0 K/UL — SIGNIFICANT CHANGE UP (ref 0–0.2)
BASOPHILS NFR BLD AUTO: 1 % — SIGNIFICANT CHANGE UP (ref 0–2)
EOSINOPHIL # BLD AUTO: 0 K/UL — SIGNIFICANT CHANGE UP (ref 0–0.5)
EOSINOPHIL NFR BLD AUTO: 3 % — SIGNIFICANT CHANGE UP (ref 0–6)
HCT VFR BLD CALC: 37.2 % — SIGNIFICANT CHANGE UP (ref 34.5–45)
HGB BLD-MCNC: 13.2 G/DL — SIGNIFICANT CHANGE UP (ref 11.5–15.5)
LYMPHOCYTES # BLD AUTO: 0.4 K/UL — LOW (ref 1–3.3)
LYMPHOCYTES # BLD AUTO: 15 % — SIGNIFICANT CHANGE UP (ref 13–44)
MCHC RBC-ENTMCNC: 31.1 PG — SIGNIFICANT CHANGE UP (ref 27–34)
MCHC RBC-ENTMCNC: 35.6 G/DL — SIGNIFICANT CHANGE UP (ref 32–36)
MCV RBC AUTO: 87.2 FL — SIGNIFICANT CHANGE UP (ref 80–100)
MONOCYTES # BLD AUTO: 0.4 K/UL — SIGNIFICANT CHANGE UP (ref 0–0.9)
MONOCYTES NFR BLD AUTO: 12 % — SIGNIFICANT CHANGE UP (ref 2–14)
NEUTROPHILS # BLD AUTO: 2.1 K/UL — SIGNIFICANT CHANGE UP (ref 1.8–7.4)
NEUTROPHILS NFR BLD AUTO: 69 % — SIGNIFICANT CHANGE UP (ref 43–77)
PLAT MORPH BLD: NORMAL — SIGNIFICANT CHANGE UP
PLATELET # BLD AUTO: 143 K/UL — LOW (ref 150–400)
RBC # BLD: 4.26 M/UL — SIGNIFICANT CHANGE UP (ref 3.8–5.2)
RBC # FLD: 13.3 % — SIGNIFICANT CHANGE UP (ref 10.3–14.5)
RBC BLD AUTO: SIGNIFICANT CHANGE UP
WBC # BLD: 3 K/UL — LOW (ref 3.8–10.5)
WBC # FLD AUTO: 3 K/UL — LOW (ref 3.8–10.5)

## 2018-10-31 PROCEDURE — 77014: CPT | Mod: 26

## 2018-10-31 PROCEDURE — 99214 OFFICE O/P EST MOD 30 MIN: CPT

## 2018-11-04 PROBLEM — R19.7 DIARRHEA: Status: ACTIVE | Noted: 2018-11-04

## 2018-11-05 ENCOUNTER — APPOINTMENT (OUTPATIENT)
Dept: PLASTIC SURGERY | Facility: CLINIC | Age: 53
End: 2018-11-05
Payer: SELF-PAY

## 2018-11-05 PROCEDURE — 99199 UNLISTED SPECIAL SVC PX/RPRT: CPT | Mod: NC

## 2018-11-12 PROCEDURE — 77770 HDR RDNCL NTRSTL/ICAV BRCHTX: CPT | Mod: 26

## 2018-11-12 PROCEDURE — 77290 THER RAD SIMULAJ FIELD CPLX: CPT | Mod: 26

## 2018-11-12 PROCEDURE — 77317 BRACHYTX ISODOSE INTERMED: CPT | Mod: 26

## 2018-11-12 PROCEDURE — 57156 INS VAG BRACHYTX DEVICE: CPT

## 2018-11-12 PROCEDURE — 77332 RADIATION TREATMENT AID(S): CPT | Mod: 26

## 2018-11-14 PROCEDURE — 57156 INS VAG BRACHYTX DEVICE: CPT

## 2018-11-14 PROCEDURE — 77332 RADIATION TREATMENT AID(S): CPT | Mod: 26

## 2018-11-14 PROCEDURE — 77770 HDR RDNCL NTRSTL/ICAV BRCHTX: CPT | Mod: 26

## 2018-11-16 PROCEDURE — 77770 HDR RDNCL NTRSTL/ICAV BRCHTX: CPT | Mod: 26

## 2018-11-16 PROCEDURE — 77332 RADIATION TREATMENT AID(S): CPT | Mod: 26

## 2018-11-16 PROCEDURE — 57156 INS VAG BRACHYTX DEVICE: CPT

## 2018-11-28 NOTE — ASSESSMENT
[FreeTextEntry1] : She is a 52 y/o  F with Stage I B cervical adenocarcinoma. She is receiving weekly cisRT started 10/3/18 , s/p 3 Cycles.  Week 4 held  2/2 tinnitus which has not resolved. She does not with to see ENT at this time.    She has expected taste disturbance, fatigue, and diarrhea controlled with Imodium.  She does not wish to return for iv fluid as she states she is  coming back and forth to the center enough times.  We reviewed supportive measures to decrease side effects. We reviewed potential cumulative fatigue and mental fog and reviewed rest and keeping notes. We reviewed her blood counts which are stable and that we would be checking her electrolytes each treatment to ensure electrolytes WNL. She will continue with oral hydration and bananas. Next follow up Monday to assess update of tinnitus.  If still not resolving we will stop chemotherapy and she will conclude with RT and again be referred to ENT.   She states she will not be receiving intravaginal RT as she is concerned there will be additional long term side effects.  Emotional support provided and we  have continued to  accommodate her schedule and request for Wednesday appointments and 3 PM arrival

## 2018-11-28 NOTE — PHYSICAL EXAM
[Fully active, able to carry on all pre-disease performance without restriction] : Status 0 - Fully active, able to carry on all pre-disease performance without restriction [Normal] : affect appropriate [de-identified] : gait stable, proprioception intact

## 2018-11-28 NOTE — REASON FOR VISIT
[Follow-Up Visit] : a follow-up [Friend] : friend [FreeTextEntry2] : evaluated during Week 2 of cisplatin with CisRT

## 2018-11-28 NOTE — HISTORY OF PRESENT ILLNESS
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 She had evaluation with Dr De Anda after positive margins with minimal cervix remaining for repeat cone biopsy. She underwent exploratory laparotomy, NADEGE, and BSO on 5/11/18. The pathology showed invasive endocervical adenocarcinoma 1.2 cm, G2 that was T1B Nx. ChemoRT was recommended. She had Pet/ CT 6/13/18 which showed mild hypermetabolic soft tissue stranding which may represent postsurgical changes. Nonspecific mildly hypermetabolic axillary LN which may be reactive. She initially refused chemoRT but changed her mind in September. 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

## 2018-11-28 NOTE — REVIEW OF SYSTEMS
[Fatigue] : fatigue [Diarrhea] : diarrhea [Negative] : Allergic/Immunologic [Fever] : no fever [Chills] : no chills [Night Sweats] : no night sweats [Recent Change In Weight] : ~T no recent weight change [Abdominal Pain] : no abdominal pain [Vomiting] : no vomiting [Constipation] : no constipation

## 2018-12-12 ENCOUNTER — APPOINTMENT (OUTPATIENT)
Dept: OTOLARYNGOLOGY | Facility: CLINIC | Age: 53
End: 2018-12-12
Payer: MEDICAID

## 2018-12-12 VITALS
SYSTOLIC BLOOD PRESSURE: 104 MMHG | HEIGHT: 62 IN | DIASTOLIC BLOOD PRESSURE: 77 MMHG | HEART RATE: 64 BPM | BODY MASS INDEX: 22.63 KG/M2 | WEIGHT: 123 LBS

## 2018-12-12 DIAGNOSIS — T45.1X5A OTHER SPECIFIED DISORDERS OF EAR, UNSPECIFIED EAR: ICD-10-CM

## 2018-12-12 DIAGNOSIS — H93.8X9 OTHER SPECIFIED DISORDERS OF EAR, UNSPECIFIED EAR: ICD-10-CM

## 2018-12-12 DIAGNOSIS — H93.19 TINNITUS, UNSPECIFIED EAR: ICD-10-CM

## 2018-12-12 DIAGNOSIS — H90.3 SENSORINEURAL HEARING LOSS, BILATERAL: ICD-10-CM

## 2018-12-12 PROCEDURE — 92557 COMPREHENSIVE HEARING TEST: CPT

## 2018-12-12 PROCEDURE — 92567 TYMPANOMETRY: CPT

## 2018-12-12 PROCEDURE — 99204 OFFICE O/P NEW MOD 45 MIN: CPT | Mod: 25

## 2019-01-03 DIAGNOSIS — B37.0 CANDIDAL STOMATITIS: ICD-10-CM

## 2019-01-07 NOTE — ASU PATIENT PROFILE, ADULT - HEALTHCARE QUESTIONS, PROFILE
It does not look or sound like its associated with the topiramate. I think she is ok to resume it.   ThanKs for checking in.      none

## 2019-01-09 ENCOUNTER — MESSAGE (OUTPATIENT)
Age: 54
End: 2019-01-09

## 2019-01-09 ENCOUNTER — APPOINTMENT (OUTPATIENT)
Dept: RADIATION ONCOLOGY | Facility: CLINIC | Age: 54
End: 2019-01-09
Payer: MEDICAID

## 2019-01-09 VITALS
RESPIRATION RATE: 18 BRPM | HEART RATE: 65 BPM | SYSTOLIC BLOOD PRESSURE: 122 MMHG | BODY MASS INDEX: 23.41 KG/M2 | OXYGEN SATURATION: 100 % | WEIGHT: 128 LBS | DIASTOLIC BLOOD PRESSURE: 84 MMHG

## 2019-01-09 PROCEDURE — 99024 POSTOP FOLLOW-UP VISIT: CPT

## 2019-01-09 NOTE — REVIEW OF SYSTEMS
[Constipation: Grade 0] : Constipation: Grade 0 [Diarrhea: Grade 1 - Increase of <4 stools per day over baseline; mild increase in ostomy output compared to baseline] : Diarrhea: Grade 1 - Increase of <4 stools per day over baseline; mild increase in ostomy output compared to baseline [Fatigue: Grade 0] : Fatigue: Grade 0 [Urinary Incontinence: Grade 0] : Urinary Incontinence: Grade 0  [Urinary Retention: Grade 0] : Urinary Retention: Grade 0 [Urinary Tract Pain: Grade 0] : Urinary Tract Pain: Grade 0 [Urinary Urgency: Grade 0] : Urinary Urgency: Grade 0 [Urinary Frequency: Grade 0] : Urinary Frequency: Grade 0 [Dyspareunia: Grade 1 - Mild discomfort or pain associated with vaginal penetration; discomfort relieved with use of vaginal lubricants or estrogen] : Dyspareunia: Grade 1 - Mild discomfort or pain associated with vaginal penetration; discomfort relieved with use of vaginal lubricants or estrogen [Depression: Grade 1 - Mild depressive symptoms] : Depression: Grade 1 - Mild depressive symptoms

## 2019-01-10 NOTE — HISTORY OF PRESENT ILLNESS
[FreeTextEntry1] : Ms. 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 Initially she refused post operative treatment. However, after the summer, she decided to proceed with the recommended chemotherapy and radiation therapy. She received treatment to the pelvis/cervix to a total dose of 4500 cGy in 25 fractions. This was followed by brachytherapy to a total dose of 1500 cGy in 3 doses. Treatment was delivered from 9/27/18 - 11/16/18. She received chemotherapy under the care of Dr. Martins. \par \par She returns today for a post treatment evaluation. She denies any pain currently. Recently diagnosed with oral thrush. She reports bouts of depression and has contacted a therapist. No urinary complaints. She has episodes of diarrhea especially when eating fibrous foods. Occasional greenish vaginal discharge. No vaginal bleeding. She uses a dilator and is sexually active with occasional dyspareunia.

## 2019-01-10 NOTE — PHYSICAL EXAM
[Abdomen Soft] : soft [Nondistended] : nondistended [Normal] : normal sphincter tone, no rectal mass and no blood on examination glove [Normal External Genitalia] : normal external genitalia  [Normal Vaginal Cuff] : vaginal cuff without lesion or nodularity [Cervical Lymph Nodes Enlarged Anterior Bilaterally] : anterior cervical [Supraclavicular Lymph Nodes Enlarged Bilaterally] : supraclavicular [Axillary Lymph Nodes Enlarged Bilaterally] : axillary [Inguinal Lymph Nodes Enlarged Bilaterally] : inguinal

## 2019-01-10 NOTE — LETTER GREETING
[Dear  ___] : Dear  [unfilled], [Follow-Up] : Your patient, [unfilled] was seen in my office today for follow-up

## 2019-01-22 ENCOUNTER — RX CHANGE (OUTPATIENT)
Age: 54
End: 2019-01-22

## 2019-02-22 ENCOUNTER — APPOINTMENT (OUTPATIENT)
Dept: PLASTIC SURGERY | Facility: CLINIC | Age: 54
End: 2019-02-22
Payer: MEDICAID

## 2019-02-22 PROCEDURE — ZZZZZ: CPT

## 2019-03-07 ENCOUNTER — APPOINTMENT (OUTPATIENT)
Dept: PLASTIC SURGERY | Facility: CLINIC | Age: 54
End: 2019-03-07

## 2019-03-08 ENCOUNTER — FORM ENCOUNTER (OUTPATIENT)
Age: 54
End: 2019-03-08

## 2019-03-09 ENCOUNTER — OUTPATIENT (OUTPATIENT)
Dept: OUTPATIENT SERVICES | Facility: HOSPITAL | Age: 54
LOS: 1 days | End: 2019-03-09
Payer: MEDICAID

## 2019-03-09 ENCOUNTER — APPOINTMENT (OUTPATIENT)
Dept: CT IMAGING | Facility: CLINIC | Age: 54
End: 2019-03-09
Payer: MEDICAID

## 2019-03-09 DIAGNOSIS — Z98.891 HISTORY OF UTERINE SCAR FROM PREVIOUS SURGERY: Chronic | ICD-10-CM

## 2019-03-09 DIAGNOSIS — C53.9 MALIGNANT NEOPLASM OF CERVIX UTERI, UNSPECIFIED: ICD-10-CM

## 2019-03-09 DIAGNOSIS — Z98.82 BREAST IMPLANT STATUS: Chronic | ICD-10-CM

## 2019-03-09 DIAGNOSIS — M21.612 BUNION OF LEFT FOOT: Chronic | ICD-10-CM

## 2019-03-09 DIAGNOSIS — Z98.890 OTHER SPECIFIED POSTPROCEDURAL STATES: Chronic | ICD-10-CM

## 2019-03-09 DIAGNOSIS — M21.611 BUNION OF RIGHT FOOT: Chronic | ICD-10-CM

## 2019-03-09 PROCEDURE — 74177 CT ABD & PELVIS W/CONTRAST: CPT | Mod: 26

## 2019-03-09 PROCEDURE — 74177 CT ABD & PELVIS W/CONTRAST: CPT

## 2019-04-17 ENCOUNTER — FORM ENCOUNTER (OUTPATIENT)
Age: 54
End: 2019-04-17

## 2019-04-18 ENCOUNTER — APPOINTMENT (OUTPATIENT)
Dept: NUCLEAR MEDICINE | Facility: IMAGING CENTER | Age: 54
End: 2019-04-18
Payer: MEDICAID

## 2019-04-18 ENCOUNTER — LABORATORY RESULT (OUTPATIENT)
Age: 54
End: 2019-04-18

## 2019-04-18 ENCOUNTER — OUTPATIENT (OUTPATIENT)
Dept: OUTPATIENT SERVICES | Facility: HOSPITAL | Age: 54
LOS: 1 days | End: 2019-04-18
Payer: MEDICAID

## 2019-04-18 DIAGNOSIS — C53.9 MALIGNANT NEOPLASM OF CERVIX UTERI, UNSPECIFIED: ICD-10-CM

## 2019-04-18 DIAGNOSIS — M21.611 BUNION OF RIGHT FOOT: Chronic | ICD-10-CM

## 2019-04-18 DIAGNOSIS — Z98.891 HISTORY OF UTERINE SCAR FROM PREVIOUS SURGERY: Chronic | ICD-10-CM

## 2019-04-18 DIAGNOSIS — Z98.82 BREAST IMPLANT STATUS: Chronic | ICD-10-CM

## 2019-04-18 DIAGNOSIS — R30.0 DYSURIA: ICD-10-CM

## 2019-04-18 DIAGNOSIS — Z98.890 OTHER SPECIFIED POSTPROCEDURAL STATES: Chronic | ICD-10-CM

## 2019-04-18 DIAGNOSIS — M21.612 BUNION OF LEFT FOOT: Chronic | ICD-10-CM

## 2019-04-18 PROCEDURE — A9552: CPT

## 2019-04-18 PROCEDURE — 78815 PET IMAGE W/CT SKULL-THIGH: CPT

## 2019-04-18 PROCEDURE — 78815 PET IMAGE W/CT SKULL-THIGH: CPT | Mod: 26,PS

## 2019-04-23 LAB
APPEARANCE: CLEAR
BILIRUBIN URINE: NEGATIVE
BLOOD URINE: NEGATIVE
COLOR: COLORLESS
GLUCOSE QUALITATIVE U: NEGATIVE
KETONES URINE: NEGATIVE
LEUKOCYTE ESTERASE URINE: ABNORMAL
NITRITE URINE: NEGATIVE
PH URINE: 5.5
PROTEIN URINE: NEGATIVE
SPECIFIC GRAVITY URINE: 1
UROBILINOGEN URINE: NORMAL

## 2019-04-25 ENCOUNTER — APPOINTMENT (OUTPATIENT)
Dept: GYNECOLOGIC ONCOLOGY | Facility: CLINIC | Age: 54
End: 2019-04-25
Payer: MEDICAID

## 2019-04-25 VITALS
WEIGHT: 125.25 LBS | BODY MASS INDEX: 23.05 KG/M2 | SYSTOLIC BLOOD PRESSURE: 112 MMHG | HEIGHT: 62 IN | HEART RATE: 56 BPM | DIASTOLIC BLOOD PRESSURE: 71 MMHG

## 2019-04-25 PROCEDURE — 99214 OFFICE O/P EST MOD 30 MIN: CPT

## 2019-04-29 LAB
CANDIDA VAG CYTO: NOT DETECTED
G VAGINALIS+PREV SP MTYP VAG QL MICRO: NOT DETECTED
HPV HIGH+LOW RISK DNA PNL CVX: NOT DETECTED
T VAGINALIS VAG QL WET PREP: NOT DETECTED

## 2019-05-01 LAB — CYTOLOGY CVX/VAG DOC THIN PREP: NORMAL

## 2019-06-08 ENCOUNTER — APPOINTMENT (OUTPATIENT)
Dept: PLASTIC SURGERY | Facility: CLINIC | Age: 54
End: 2019-06-08
Payer: COMMERCIAL

## 2019-06-08 DIAGNOSIS — H02.835 DERMATOCHALASIS OF RIGHT LOWER EYELID: ICD-10-CM

## 2019-06-08 DIAGNOSIS — H02.832 DERMATOCHALASIS OF RIGHT LOWER EYELID: ICD-10-CM

## 2019-06-08 NOTE — ASSESSMENT
[FreeTextEntry1] : 55 yo F who presents for follow up visit for Botox injection and interested in lower eyelid surgery.\par \par On exam, patient has deramtochalasis, fat herniation of the lower eyelids and deflation. We discussed bilateral lower eyelid blepharolplasty, with fat repositioning and fat grafting. We discussed the planned procedure including alternatives, risks and potential complications which include but are not limited to infection, bleeding, recurrence, asymmetry, deformity, scarring, paresthesia, wound dehiscence, malposition. All questions were answered, and patient would like to proceed.\par \par We will schedule for surgery as an outpatient surgery/office procedure at our earliest mutual convenient time\par \par \par The patient would like to undergo Botox injections at this time. \par Before the procedure the patient's name and  were confirmed with the patient. It was confirmed that the patient would not be allergic to the injection. The patient's skin was sterilely prepped. She received 36 units total: 12 units to the forehead, 12 units to the glabella, 6 units to the crow's feet bilaterally. Patient tolerated injections well with no complications. Ice was applied to the injected areas and was advised to continue to ice for the rest of the day to decrease swelling.\par \par Follow up in 2-3 weeks, or as needed.

## 2019-06-30 NOTE — HISTORY OF PRESENT ILLNESS
[FreeTextEntry1] : s/p 9/25 fxns\par She is complaining of irritation of the skin around her vagina\par She is very upset in general. She is worried about vaginal dryness\par She is still refusing brachytherapy
no

## 2019-07-18 ENCOUNTER — APPOINTMENT (OUTPATIENT)
Dept: GYNECOLOGIC ONCOLOGY | Facility: CLINIC | Age: 54
End: 2019-07-18
Payer: COMMERCIAL

## 2019-07-18 VITALS
SYSTOLIC BLOOD PRESSURE: 106 MMHG | DIASTOLIC BLOOD PRESSURE: 69 MMHG | HEART RATE: 69 BPM | WEIGHT: 131 LBS | HEIGHT: 62 IN | BODY MASS INDEX: 24.11 KG/M2

## 2019-07-18 PROCEDURE — 99214 OFFICE O/P EST MOD 30 MIN: CPT

## 2019-08-30 ENCOUNTER — MOBILE ON CALL (OUTPATIENT)
Age: 54
End: 2019-08-30

## 2019-09-11 ENCOUNTER — APPOINTMENT (OUTPATIENT)
Dept: PLASTIC SURGERY | Facility: CLINIC | Age: 54
End: 2019-09-11
Payer: COMMERCIAL

## 2019-09-11 PROCEDURE — 99199 UNLISTED SPECIAL SVC PX/RPRT: CPT | Mod: NC

## 2019-09-19 NOTE — ASSESSMENT
[FreeTextEntry1] : 55 yo F here for Botox injection to face.\par \par The patient would like to undergo Botox injections at this time. Before the procedure the patient's name and  were confirmed with the patient. It was confirmed that the patient would not be allergic to the injection. The patient's skin was prepped in the usual sterile fashion. \par \par She received 24  units total. Patient received 8 in the forehead, 8 in the glabella, 4  in the crow's feet bilaterally.\par Patient tolerated injections well with no complications. Ice was applied to the injected areas and was advised to continue to ice for the rest of the day to decrease swelling.\par \par Follow up in PRN.

## 2019-10-14 ENCOUNTER — APPOINTMENT (OUTPATIENT)
Dept: GYNECOLOGIC ONCOLOGY | Facility: CLINIC | Age: 54
End: 2019-10-14

## 2019-10-29 ENCOUNTER — APPOINTMENT (OUTPATIENT)
Dept: RADIATION ONCOLOGY | Facility: CLINIC | Age: 54
End: 2019-10-29
Payer: COMMERCIAL

## 2019-10-29 VITALS
RESPIRATION RATE: 18 BRPM | WEIGHT: 132 LBS | OXYGEN SATURATION: 100 % | DIASTOLIC BLOOD PRESSURE: 66 MMHG | BODY MASS INDEX: 24.14 KG/M2 | TEMPERATURE: 36.9 F | SYSTOLIC BLOOD PRESSURE: 99 MMHG | HEART RATE: 60 BPM

## 2019-10-29 PROCEDURE — 99213 OFFICE O/P EST LOW 20 MIN: CPT

## 2019-10-30 NOTE — HISTORY OF PRESENT ILLNESS
[FreeTextEntry1] : Ms. 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 Initially she refused post operative treatment. However, after the summer, she decided to proceed with the recommended chemotherapy and radiation therapy. She received treatment to the pelvis/cervix to a total dose of 4500 cGy in 25 fractions. This was followed by brachytherapy to a total dose of 1500 cGy in 3 doses. Treatment was delivered from 9/27/18 - 11/16/18. She received chemotherapy under the care of Dr. Martins. \par \par She returns today for a routine follow up. Since last visit in January 2019, she had a CT A/P in 3/2019 with small left adnexal lesion possibly a lymphocele or seroma, small right pelvic sidewall (1.8 x 0.9 cm) and left para-aortic lymph node (1.4 x 0.9cm), PET/CT 4/2019 with no evidence of recurrent or metastatic FDG avid disease, minimally FDG avid postsurgical changes in anterior abdominal wall, new increased FDG activity in bilateral thoracic paraspinal muscles\par \par She reports dyspareunia with vaginal bleeding afterwards. She stated use of vaginal dilator is more painful even with use of lubricants, noted more difficulty with advancing the dilator during use. She uses replens vaginal moisturizer and lubricants. Continue to experience Pinkish vaginal discharge. She also reports episodes of fecal urgency She followed up with Dr De Anda in July.  She had co-testing done which was negative.

## 2019-10-30 NOTE — REVIEW OF SYSTEMS
[Constipation: Grade 0] : Constipation: Grade 0 [Diarrhea: Grade 1 - Increase of <4 stools per day over baseline; mild increase in ostomy output compared to baseline] : Diarrhea: Grade 1 - Increase of <4 stools per day over baseline; mild increase in ostomy output compared to baseline [Fatigue: Grade 0] : Fatigue: Grade 0 [Urinary Incontinence: Grade 0] : Urinary Incontinence: Grade 0  [Urinary Retention: Grade 0] : Urinary Retention: Grade 0 [Urinary Tract Pain: Grade 0] : Urinary Tract Pain: Grade 0 [Urinary Urgency: Grade 0] : Urinary Urgency: Grade 0 [Urinary Frequency: Grade 0] : Urinary Frequency: Grade 0 [Depression: Grade 1 - Mild depressive symptoms] : Depression: Grade 1 - Mild depressive symptoms [Dyspareunia: Grade 2 - Moderate discomfort or pain associated with vaginal penetration; discomfort or pain partially relieved with use of vaginal lubricants or estrogen] : Dyspareunia: Grade 2 - Moderate discomfort or pain associated with vaginal penetration; discomfort or pain partially relieved with use of vaginal lubricants or estrogen [Fecal Incontinence: Grade 1 - Occasional use of pads required] : Fecal Incontinence: Grade 1 - Occasional use of pads required [FreeTextEntry8] : fecal urgency [Vaginal Stricture: Grade 2 - Vaginal narrowing and/or shortening not interfering with physical examination] : Vaginal Stricture: Grade 2 - Vaginal narrowing and/or shortening not interfering with physical examination

## 2019-10-30 NOTE — PHYSICAL EXAM
[Abdomen Soft] : soft [Nondistended] : nondistended [Normal] : normal sphincter tone, no rectal mass and no blood on examination glove [Normal External Genitalia] : normal external genitalia  [Normal Vaginal Cuff] : vaginal cuff without lesion or nodularity [Cervical Lymph Nodes Enlarged Anterior Bilaterally] : anterior cervical [Supraclavicular Lymph Nodes Enlarged Bilaterally] : supraclavicular [Axillary Lymph Nodes Enlarged Bilaterally] : axillary [Inguinal Lymph Nodes Enlarged Bilaterally] : inguinal [] : no respiratory distress [de-identified] : there is mild narrowing at the apex I am able to examine with 2 fingers without pain

## 2019-12-26 ENCOUNTER — APPOINTMENT (OUTPATIENT)
Dept: PLASTIC SURGERY | Facility: CLINIC | Age: 54
End: 2019-12-26
Payer: COMMERCIAL

## 2019-12-30 NOTE — REASON FOR VISIT
[Follow-Up: _____] : a [unfilled] follow-up visit [FreeTextEntry1] : Pt presents in office for repeat Botox injections

## 2020-02-02 ENCOUNTER — FORM ENCOUNTER (OUTPATIENT)
Age: 55
End: 2020-02-02

## 2020-02-03 ENCOUNTER — APPOINTMENT (OUTPATIENT)
Dept: CT IMAGING | Facility: CLINIC | Age: 55
End: 2020-02-03
Payer: COMMERCIAL

## 2020-02-03 ENCOUNTER — OUTPATIENT (OUTPATIENT)
Dept: OUTPATIENT SERVICES | Facility: HOSPITAL | Age: 55
LOS: 1 days | End: 2020-02-03
Payer: COMMERCIAL

## 2020-02-03 DIAGNOSIS — Z98.890 OTHER SPECIFIED POSTPROCEDURAL STATES: Chronic | ICD-10-CM

## 2020-02-03 DIAGNOSIS — Z98.891 HISTORY OF UTERINE SCAR FROM PREVIOUS SURGERY: Chronic | ICD-10-CM

## 2020-02-03 DIAGNOSIS — Z98.82 BREAST IMPLANT STATUS: Chronic | ICD-10-CM

## 2020-02-03 DIAGNOSIS — M21.612 BUNION OF LEFT FOOT: Chronic | ICD-10-CM

## 2020-02-03 DIAGNOSIS — Z00.8 ENCOUNTER FOR OTHER GENERAL EXAMINATION: ICD-10-CM

## 2020-02-03 DIAGNOSIS — M21.611 BUNION OF RIGHT FOOT: Chronic | ICD-10-CM

## 2020-02-03 PROCEDURE — 74177 CT ABD & PELVIS W/CONTRAST: CPT | Mod: 26

## 2020-02-03 PROCEDURE — 74177 CT ABD & PELVIS W/CONTRAST: CPT

## 2020-02-13 ENCOUNTER — APPOINTMENT (OUTPATIENT)
Dept: PLASTIC SURGERY | Facility: CLINIC | Age: 55
End: 2020-02-13
Payer: COMMERCIAL

## 2020-02-13 PROCEDURE — 99499Q: CUSTOM | Mod: NC

## 2020-02-17 NOTE — REASON FOR VISIT
[Follow-Up: _____] : a [unfilled] follow-up visit [FreeTextEntry1] : Pt presents here for repeat Botox Injections.

## 2020-02-28 ENCOUNTER — APPOINTMENT (OUTPATIENT)
Dept: GASTROENTEROLOGY | Facility: CLINIC | Age: 55
End: 2020-02-28
Payer: COMMERCIAL

## 2020-02-28 VITALS
HEART RATE: 72 BPM | SYSTOLIC BLOOD PRESSURE: 100 MMHG | TEMPERATURE: 98.6 F | WEIGHT: 123 LBS | OXYGEN SATURATION: 98 % | DIASTOLIC BLOOD PRESSURE: 60 MMHG | HEIGHT: 62 IN | BODY MASS INDEX: 22.63 KG/M2

## 2020-02-28 DIAGNOSIS — Z92.3 PERSONAL HISTORY OF IRRADIATION: ICD-10-CM

## 2020-02-28 DIAGNOSIS — R12 HEARTBURN: ICD-10-CM

## 2020-02-28 DIAGNOSIS — Z85.41 PERSONAL HISTORY OF MALIGNANT NEOPLASM OF CERVIX UTERI: ICD-10-CM

## 2020-02-28 DIAGNOSIS — Z80.49 FAMILY HISTORY OF MALIGNANT NEOPLASM OF OTHER GENITAL ORGANS: ICD-10-CM

## 2020-02-28 DIAGNOSIS — Z80.6 FAMILY HISTORY OF LEUKEMIA: ICD-10-CM

## 2020-02-28 DIAGNOSIS — Z92.21 PERSONAL HISTORY OF ANTINEOPLASTIC CHEMOTHERAPY: ICD-10-CM

## 2020-02-28 PROCEDURE — 99203 OFFICE O/P NEW LOW 30 MIN: CPT

## 2020-02-28 RX ORDER — LIDOCAINE 4% 4 G/100G
4 CREAM TOPICAL
Qty: 1 | Refills: 1 | Status: DISCONTINUED | COMMUNITY
Start: 2019-08-30 | End: 2020-02-28

## 2020-02-28 RX ORDER — FLUCONAZOLE 150 MG/1
150 TABLET ORAL DAILY
Qty: 7 | Refills: 1 | Status: DISCONTINUED | COMMUNITY
Start: 2018-10-25 | End: 2020-02-28

## 2020-02-28 RX ORDER — PHENAZOPYRIDINE 200 MG/1
200 TABLET, FILM COATED ORAL 3 TIMES DAILY
Qty: 21 | Refills: 0 | Status: DISCONTINUED | COMMUNITY
Start: 2019-05-07 | End: 2020-02-28

## 2020-02-28 RX ORDER — NYSTATIN 100000 [USP'U]/ML
100000 SUSPENSION ORAL 4 TIMES DAILY
Qty: 1 | Refills: 1 | Status: DISCONTINUED | COMMUNITY
Start: 2019-04-25 | End: 2020-02-28

## 2020-02-28 RX ORDER — TERCONAZOLE 80 MG/1
80 SUPPOSITORY VAGINAL
Qty: 6 | Refills: 0 | Status: DISCONTINUED | COMMUNITY
Start: 2018-10-24 | End: 2020-02-28

## 2020-02-28 RX ORDER — SULFAMETHOXAZOLE AND TRIMETHOPRIM 800; 160 MG/1; MG/1
800-160 TABLET ORAL TWICE DAILY
Qty: 6 | Refills: 0 | Status: DISCONTINUED | COMMUNITY
Start: 2019-04-19 | End: 2020-02-28

## 2020-02-28 RX ORDER — METHYLPREDNISOLONE 4 MG/1
4 TABLET ORAL
Qty: 1 | Refills: 0 | Status: DISCONTINUED | COMMUNITY
Start: 2018-12-12 | End: 2020-02-28

## 2020-02-28 RX ORDER — NYSTATIN 100000 [USP'U]/ML
100000 SUSPENSION ORAL 4 TIMES DAILY
Qty: 140 | Refills: 1 | Status: DISCONTINUED | COMMUNITY
Start: 2019-01-03 | End: 2020-02-28

## 2020-02-28 RX ORDER — SULFAMETHOXAZOLE AND TRIMETHOPRIM 800; 160 MG/1; MG/1
800-160 TABLET ORAL
Qty: 4 | Refills: 0 | Status: DISCONTINUED | COMMUNITY
Start: 2019-05-10 | End: 2020-02-28

## 2020-02-28 RX ORDER — SULFAMETHOXAZOLE AND TRIMETHOPRIM 800; 160 MG/1; MG/1
800-160 TABLET ORAL TWICE DAILY
Qty: 6 | Refills: 0 | Status: DISCONTINUED | COMMUNITY
Start: 2019-05-07 | End: 2020-02-28

## 2020-02-28 RX ORDER — PROCHLORPERAZINE MALEATE 10 MG/1
10 TABLET ORAL
Qty: 30 | Refills: 0 | Status: DISCONTINUED | COMMUNITY
Start: 2018-09-17 | End: 2020-02-28

## 2020-02-28 RX ORDER — MULTIVITAMIN
TABLET ORAL
Refills: 0 | Status: ACTIVE | COMMUNITY

## 2020-02-28 NOTE — PHYSICAL EXAM
[General Appearance - Alert] : alert [General Appearance - In No Acute Distress] : in no acute distress [Neck Cervical Mass (___cm)] : no neck mass was observed [Neck Appearance] : the appearance of the neck was normal [Jugular Venous Distention Increased] : there was no jugular-venous distention [Thyroid Diffuse Enlargement] : the thyroid was not enlarged [Thyroid Nodule] : there were no palpable thyroid nodules [Auscultation Breath Sounds / Voice Sounds] : lungs were clear to auscultation bilaterally [Heart Rate And Rhythm] : heart rate was normal and rhythm regular [Heart Sounds] : normal S1 and S2 [Heart Sounds Gallop] : no gallops [Murmurs] : no murmurs [Heart Sounds Pericardial Friction Rub] : no pericardial rub [Edema] : there was no peripheral edema [Bowel Sounds] : normal bowel sounds [Abdomen Soft] : soft [] : no hepato-splenomegaly [Abdomen Mass (___ Cm)] : no abdominal mass palpated [Abdomen Tenderness] : non-tender [No CVA Tenderness] : no ~M costovertebral angle tenderness [No Spinal Tenderness] : no spinal tenderness [Oriented To Time, Place, And Person] : oriented to person, place, and time [Impaired Insight] : insight and judgment were intact [Affect] : the affect was normal

## 2020-02-29 NOTE — HISTORY OF PRESENT ILLNESS
[FreeTextEntry1] : The patient is a 54-year-old woman with a history of cervical cancer diagnosed in February 2018 treated with surgery, chemotherapy, and radiation.  She has intermittent heartburn with pain in the chest with swallowing.  She reports that food gets stuck.  This occurs about once a month.  She takes Tums on occasion with relief.  She denies abdominal pain.  She has 2-3 loose bowel movements a day without melena or bright red blood per rectum.  The patient's weight is stable.  The patient has not been admitted to the hospital in the past year and denies any cardiac issues.  The patient's last colonoscopy was in 2014.

## 2020-02-29 NOTE — ASSESSMENT
[FreeTextEntry1] : Patient with complaints of intermittent heartburn and dysphasia along with daily loose stools.  She has a history of cervical cancer.\par \par An EGD and colonoscopy have been scheduled. The risks, benefits, alternatives, and limitations of the procedures, including the possibility of missed lesions, were explained.

## 2020-02-29 NOTE — CONSULT LETTER
[FreeTextEntry1] : Dear Dr. Alf Castro,\[facility] \[facility] I had the pleasure of seeing your patient JASSI BIRD in the office today.  My office note is attached. PLEASE READ THE "ASSESSMENT" SECTION OF THE NOTE TO SEE MY IMPRESSION AND PLAN.\par \[facility] Thank you very much for allowing me to participate in the care of your patient.\[facility] \[facility] Sincerely,\[facility] \[facility] Willi Novak M.D., FAC, [facility] Director, Celiac Program at [facility]  of Medicine\[facility] rich [employer] [facility] [employer] at [facility] Practice Director,[facility] - Gastroenterology/Internal Medicine at [place]\[facility] [address]\[place], [place] [address] Tel: [phone]\[facility] Email: [email or web address] \[facility] \[facility] The attached note has been created using a voice recognition system (Dragon).  There may be some misspellings and typos.  Please call my office if you have any issues or questions.

## 2020-03-02 ENCOUNTER — APPOINTMENT (OUTPATIENT)
Dept: GASTROENTEROLOGY | Facility: CLINIC | Age: 55
End: 2020-03-02

## 2020-03-04 ENCOUNTER — APPOINTMENT (OUTPATIENT)
Dept: PLASTIC SURGERY | Facility: CLINIC | Age: 55
End: 2020-03-04
Payer: COMMERCIAL

## 2020-03-04 VITALS — WEIGHT: 123 LBS | HEIGHT: 62 IN | BODY MASS INDEX: 22.63 KG/M2

## 2020-03-04 PROCEDURE — 99199 UNLISTED SPECIAL SVC PX/RPRT: CPT | Mod: NC

## 2020-03-24 ENCOUNTER — APPOINTMENT (OUTPATIENT)
Dept: GASTROENTEROLOGY | Facility: AMBULATORY MEDICAL SERVICES | Age: 55
End: 2020-03-24

## 2020-06-08 ENCOUNTER — APPOINTMENT (OUTPATIENT)
Dept: DISASTER EMERGENCY | Facility: CLINIC | Age: 55
End: 2020-06-08

## 2020-06-10 ENCOUNTER — APPOINTMENT (OUTPATIENT)
Dept: DISASTER EMERGENCY | Facility: CLINIC | Age: 55
End: 2020-06-10

## 2020-06-10 DIAGNOSIS — Z01.818 ENCOUNTER FOR OTHER PREPROCEDURAL EXAMINATION: ICD-10-CM

## 2020-06-10 LAB — SARS-COV-2 N GENE NPH QL NAA+PROBE: NOT DETECTED

## 2020-06-11 ENCOUNTER — APPOINTMENT (OUTPATIENT)
Dept: GASTROENTEROLOGY | Facility: AMBULATORY MEDICAL SERVICES | Age: 55
End: 2020-06-11

## 2020-06-11 ENCOUNTER — RX RENEWAL (OUTPATIENT)
Age: 55
End: 2020-06-11

## 2020-06-16 DIAGNOSIS — Z01.818 ENCOUNTER FOR OTHER PREPROCEDURAL EXAMINATION: ICD-10-CM

## 2020-06-17 ENCOUNTER — APPOINTMENT (OUTPATIENT)
Dept: DISASTER EMERGENCY | Facility: CLINIC | Age: 55
End: 2020-06-17

## 2020-06-17 LAB — SARS-COV-2 N GENE NPH QL NAA+PROBE: NOT DETECTED

## 2020-06-19 ENCOUNTER — APPOINTMENT (OUTPATIENT)
Dept: GASTROENTEROLOGY | Facility: AMBULATORY MEDICAL SERVICES | Age: 55
End: 2020-06-19
Payer: COMMERCIAL

## 2020-06-19 PROCEDURE — 43239 EGD BIOPSY SINGLE/MULTIPLE: CPT

## 2020-06-19 PROCEDURE — 45380 COLONOSCOPY AND BIOPSY: CPT

## 2020-06-25 ENCOUNTER — APPOINTMENT (OUTPATIENT)
Dept: PLASTIC SURGERY | Facility: CLINIC | Age: 55
End: 2020-06-25
Payer: SELF-PAY

## 2020-06-25 VITALS — BODY MASS INDEX: 22.63 KG/M2 | WEIGHT: 123 LBS | HEIGHT: 62 IN

## 2020-07-01 ENCOUNTER — APPOINTMENT (OUTPATIENT)
Dept: GASTROENTEROLOGY | Facility: CLINIC | Age: 55
End: 2020-07-01
Payer: COMMERCIAL

## 2020-07-01 DIAGNOSIS — K63.5 POLYP OF COLON: ICD-10-CM

## 2020-07-01 DIAGNOSIS — K57.30 DIVERTICULOSIS OF LARGE INTESTINE W/OUT PERFORATION OR ABSCESS W/OUT BLEEDING: ICD-10-CM

## 2020-07-01 DIAGNOSIS — K64.4 RESIDUAL HEMORRHOIDAL SKIN TAGS: ICD-10-CM

## 2020-07-01 DIAGNOSIS — R19.5 OTHER FECAL ABNORMALITIES: ICD-10-CM

## 2020-07-01 PROCEDURE — 99214 OFFICE O/P EST MOD 30 MIN: CPT | Mod: 95

## 2020-07-01 RX ORDER — SODIUM PICOSULFATE, MAGNESIUM OXIDE, AND ANHYDROUS CITRIC ACID 10; 3.5; 12 MG/160ML; G/160ML; G/160ML
10-3.5-12 MG-GM LIQUID ORAL
Qty: 1 | Refills: 0 | Status: DISCONTINUED | COMMUNITY
Start: 2020-02-28 | End: 2020-07-01

## 2020-07-01 RX ORDER — HYDROCORTISONE 2.5% 25 MG/G
2.5 CREAM TOPICAL
Qty: 1 | Refills: 1 | Status: ACTIVE | COMMUNITY
Start: 2020-07-01 | End: 1900-01-01

## 2020-07-01 RX ORDER — POLYETHYLENE GLYCOL 3350 AND ELECTROLYTES WITH LEMON FLAVOR 236; 22.74; 6.74; 5.86; 2.97 G/4L; G/4L; G/4L; G/4L; G/4L
236 POWDER, FOR SOLUTION ORAL
Qty: 1 | Refills: 0 | Status: DISCONTINUED | COMMUNITY
Start: 2020-06-09 | End: 2020-07-01

## 2020-07-01 RX ORDER — SODIUM SULFATE, POTASSIUM SULFATE, MAGNESIUM SULFATE 17.5; 3.13; 1.6 G/ML; G/ML; G/ML
17.5-3.13-1.6 SOLUTION, CONCENTRATE ORAL
Qty: 1 | Refills: 0 | Status: DISCONTINUED | COMMUNITY
Start: 2020-06-09 | End: 2020-07-01

## 2020-07-01 RX ORDER — POLYETHYLENE GLYCOL 3350, SODIUM CHLORIDE, SODIUM BICARBONATE AND POTASSIUM CHLORIDE WITH LEMON FLAVOR 420; 11.2; 5.72; 1.48 G/4L; G/4L; G/4L; G/4L
420 POWDER, FOR SOLUTION ORAL
Qty: 4000 | Refills: 0 | Status: DISCONTINUED | COMMUNITY
Start: 2020-06-11 | End: 2020-07-01

## 2020-07-01 NOTE — HISTORY OF PRESENT ILLNESS
[Medical Office: ([facility])___] : at the medical office located in  [Home] : at home, [unfilled] , at the time of the visit. [Verbal consent obtained from patient] : the patient, [unfilled] [FreeTextEntry1] : We reviewed the results of the EGD and colonoscopy performed on June 19, 2020.  EGD was grossly normal but biopsies showed focal intestinal metaplasia at the GE junction.  Colonoscopy was significant for removal of a polyp from the mid transverse colon which was hyperplastic.  The patient also has mild to moderate diverticulosis throughout the colon as well as internal and external hemorrhoids which do cause burning in her rectum.  Random right and left colonic biopsies were negative.  The patient reports intermittent episodes of heartburn that last for up to a week at a time occurring 2-3 times a month.  The patient takes Gaviscon with some relief.  She denies dysphasia.  She reports 4-5 loose bowel movements a day dating back to her radiation for cervical cancer.  She denies melena or bright red blood per rectum.\par \par \par Note from 2/28/2020 - The patient is a 54-year-old woman with a history of cervical cancer diagnosed in February 2018 treated with surgery, chemotherapy, and radiation.  She has intermittent heartburn with pain in the chest with swallowing.  She reports that food gets stuck.  This occurs about once a month.  She takes Tums on occasion with relief.  She denies abdominal pain.  She has 2-3 loose bowel movements a day without melena or bright red blood per rectum.  The patient's weight is stable.  The patient has not been admitted to the hospital in the past year and denies any cardiac issues.  The patient's last colonoscopy was in 2014.

## 2020-07-01 NOTE — CONSULT LETTER
[FreeTextEntry1] : Dear Dr. Alf Castro,\[facility] \[facility] I had the pleasure of seeing your patient JASSI BIRD in the office today.  My office note is attached. PLEASE READ THE "ASSESSMENT" SECTION OF THE NOTE TO SEE MY IMPRESSION AND PLAN.\par \[facility] Thank you very much for allowing me to participate in the care of your patient.\[facility] \[facility] Sincerely,\[facility] \[facility] Willi Novak M.D., FAC, [facility] Director, Celiac Program at [facility]  of Medicine\[facility] rich [employer] [facility] [employer] at [facility] Practice Director,[facility] - Gastroenterology/Internal Medicine at [place]\[facility] [address]\[place], [place] [address] Tel: [phone]\[facility] Email: [email or web address] \[facility] \[facility] The attached note has been created using a voice recognition system (Dragon).  There may be some misspellings and typos.  Please call my office if you have any issues or questions.

## 2020-07-01 NOTE — ASSESSMENT
[FreeTextEntry1] : Patient with focal intestinal metaplasia at the GE junction.  She does have symptomatic reflux with episodic heartburn.  Colonoscopy was significant for removal of a hyperplastic polyp as well as diverticulosis and hemorrhoids which are symptomatic.\par \par The patient was started on pantoprazole 20 mg a day.  Additionally, A list of dietary and lifestyle modifications in the treatment of GERD was given to the patient.\par \par Patient was counseled regarding a high-fiber diet.\par \par Patient was given hydrocortisone 2.5% cream to use as needed for hemorrhoids.\par \par We will repeat an EGD in 3 years that is June 2023 and a colonoscopy in 5 years that is June 2025.\par \par Patient will return to see me in 3 months to assess her response to the pantoprazole.\par \par \par Plan from 2/28/2020 - Patient with complaints of intermittent heartburn and dysphasia along with daily loose stools.  She has a history of cervical cancer.\par \par An EGD and colonoscopy have been scheduled. The risks, benefits, alternatives, and limitations of the procedures, including the possibility of missed lesions, were explained.

## 2020-07-01 NOTE — REASON FOR VISIT
[FreeTextEntry1] : Hyperplastic colonic polyp, diverticulosis, hemorrhoids, intestinal metaplasia at the GE junction, reflux, loose stools

## 2020-07-09 ENCOUNTER — APPOINTMENT (OUTPATIENT)
Dept: PLASTIC SURGERY | Facility: CLINIC | Age: 55
End: 2020-07-09
Payer: COMMERCIAL

## 2020-07-09 PROCEDURE — G0429: CPT

## 2020-07-15 ENCOUNTER — APPOINTMENT (OUTPATIENT)
Dept: RADIATION ONCOLOGY | Facility: CLINIC | Age: 55
End: 2020-07-15

## 2020-07-22 ENCOUNTER — APPOINTMENT (OUTPATIENT)
Dept: RADIATION ONCOLOGY | Facility: CLINIC | Age: 55
End: 2020-07-22
Payer: COMMERCIAL

## 2020-07-22 VITALS
OXYGEN SATURATION: 99 % | HEART RATE: 71 BPM | SYSTOLIC BLOOD PRESSURE: 105 MMHG | WEIGHT: 127.65 LBS | DIASTOLIC BLOOD PRESSURE: 74 MMHG | RESPIRATION RATE: 16 BRPM | BODY MASS INDEX: 23.35 KG/M2

## 2020-07-22 PROCEDURE — 99213 OFFICE O/P EST LOW 20 MIN: CPT

## 2020-07-22 RX ORDER — PANTOPRAZOLE 20 MG/1
20 TABLET, DELAYED RELEASE ORAL
Qty: 90 | Refills: 1 | Status: DISCONTINUED | COMMUNITY
Start: 2020-07-01 | End: 2020-07-22

## 2020-07-22 NOTE — REASON FOR VISIT
[Follow-Up: _____] : a [unfilled] follow-up visit [FreeTextEntry1] : Pt presents for filler injection to lips

## 2020-07-24 NOTE — HISTORY OF PRESENT ILLNESS
[FreeTextEntry1] : Ms. Angelica Hay is a 54 year-old female with Stage IB cervical adenocarcinoma, s/p exploratory laparotomy / total abdominal hysterectomy / bilateral salpingectomy on 5/11/18. Initially she refused post operative treatment, but decided to proceed with the recommended chemotherapy and radiation therapy eventually. She received treatment to the pelvis/cervix to a total dose of 4500 cGy in 25 fractions. This was followed by brachytherapy to a total dose of 1500 cGy in 3 doses. Treatment was delivered from 9/27/18 - 11/16/18. She received chemotherapy under the care of Dr. Martins. \par \par She returns today for a routine follow up. Since last visit in October 2019:, she had CT A/P 2/4/2020 - no evidence of recurrence. Noted stable small left para-aortic lymph node measuring 1.0 x 0.6 cm in size image 36. Stable right external iliac lymph node series 2 image 73 measuring 1.6 x 0.6 cm.. EGD and colonoscopy performed on June 19, 2020. EGD was grossly normal but biopsies showed focal intestinal metaplasia at the GE junction. She was prescribed Protonix but has not started it. She is taking natural supplements instead. Will follow up with GI in 3 months. No urinary or bowel complaints. Baseline loose BMs. No vaginal bleeding or discharge. Occasional dyspareunia, can experience bleeding with intercourse.\par

## 2020-07-24 NOTE — PHYSICAL EXAM
[de-identified] : there is mild narrowing at the apex I am able to examine with 2 fingers without pain  No vaginal lesions seen No obvious bleeding points radiation changes

## 2020-08-27 ENCOUNTER — APPOINTMENT (OUTPATIENT)
Dept: PLASTIC SURGERY | Facility: CLINIC | Age: 55
End: 2020-08-27

## 2020-09-24 ENCOUNTER — APPOINTMENT (OUTPATIENT)
Dept: PLASTIC SURGERY | Facility: CLINIC | Age: 55
End: 2020-09-24
Payer: COMMERCIAL

## 2020-09-24 VITALS
SYSTOLIC BLOOD PRESSURE: 113 MMHG | OXYGEN SATURATION: 98 % | BODY MASS INDEX: 23.37 KG/M2 | TEMPERATURE: 98.3 F | HEIGHT: 62 IN | DIASTOLIC BLOOD PRESSURE: 76 MMHG | HEART RATE: 73 BPM | WEIGHT: 127 LBS

## 2020-09-24 DIAGNOSIS — N64.81 PTOSIS OF BREAST: ICD-10-CM

## 2020-09-24 DIAGNOSIS — N62 HYPERTROPHY OF BREAST: ICD-10-CM

## 2020-09-24 DIAGNOSIS — Z98.82 BREAST IMPLANT STATUS: ICD-10-CM

## 2020-09-24 PROCEDURE — 99212 OFFICE O/P EST SF 10 MIN: CPT | Mod: 25

## 2020-09-24 PROCEDURE — G0429: CPT

## 2020-09-25 PROBLEM — N64.81 BREAST PTOSIS: Status: ACTIVE | Noted: 2020-09-25

## 2020-09-25 PROBLEM — Z98.82 S/P BREAST AUGMENTATION: Status: ACTIVE | Noted: 2020-09-25

## 2020-09-25 PROBLEM — N62 MACROMASTIA: Status: ACTIVE | Noted: 2020-09-25

## 2020-10-26 ENCOUNTER — EMERGENCY (EMERGENCY)
Facility: HOSPITAL | Age: 55
LOS: 0 days | Discharge: HOME | End: 2020-10-26
Attending: EMERGENCY MEDICINE
Payer: COMMERCIAL

## 2020-10-26 VITALS
OXYGEN SATURATION: 97 % | TEMPERATURE: 99 F | HEIGHT: 62 IN | WEIGHT: 126.99 LBS | RESPIRATION RATE: 16 BRPM | HEART RATE: 65 BPM | SYSTOLIC BLOOD PRESSURE: 112 MMHG | DIASTOLIC BLOOD PRESSURE: 66 MMHG

## 2020-10-26 DIAGNOSIS — N20.0 CALCULUS OF KIDNEY: ICD-10-CM

## 2020-10-26 DIAGNOSIS — R10.32 LEFT LOWER QUADRANT PAIN: ICD-10-CM

## 2020-10-26 DIAGNOSIS — Z98.890 OTHER SPECIFIED POSTPROCEDURAL STATES: Chronic | ICD-10-CM

## 2020-10-26 DIAGNOSIS — R10.31 RIGHT LOWER QUADRANT PAIN: ICD-10-CM

## 2020-10-26 DIAGNOSIS — Z85.41 PERSONAL HISTORY OF MALIGNANT NEOPLASM OF CERVIX UTERI: ICD-10-CM

## 2020-10-26 DIAGNOSIS — K57.92 DIVERTICULITIS OF INTESTINE, PART UNSPECIFIED, WITHOUT PERFORATION OR ABSCESS WITHOUT BLEEDING: ICD-10-CM

## 2020-10-26 DIAGNOSIS — R11.2 NAUSEA WITH VOMITING, UNSPECIFIED: ICD-10-CM

## 2020-10-26 DIAGNOSIS — M21.611 BUNION OF RIGHT FOOT: Chronic | ICD-10-CM

## 2020-10-26 DIAGNOSIS — Z98.82 BREAST IMPLANT STATUS: Chronic | ICD-10-CM

## 2020-10-26 DIAGNOSIS — M21.612 BUNION OF LEFT FOOT: Chronic | ICD-10-CM

## 2020-10-26 DIAGNOSIS — Z98.891 HISTORY OF UTERINE SCAR FROM PREVIOUS SURGERY: Chronic | ICD-10-CM

## 2020-10-26 LAB
ALBUMIN SERPL ELPH-MCNC: 3.6 G/DL — SIGNIFICANT CHANGE UP (ref 3.3–5)
ALP SERPL-CCNC: 94 U/L — SIGNIFICANT CHANGE UP (ref 40–120)
ALT FLD-CCNC: 27 U/L — SIGNIFICANT CHANGE UP (ref 12–78)
AMYLASE P1 CFR SERPL: 74 U/L — SIGNIFICANT CHANGE UP (ref 25–115)
ANION GAP SERPL CALC-SCNC: 5 MMOL/L — SIGNIFICANT CHANGE UP (ref 5–17)
AST SERPL-CCNC: 29 U/L — SIGNIFICANT CHANGE UP (ref 15–37)
BASOPHILS # BLD AUTO: 0.03 K/UL — SIGNIFICANT CHANGE UP (ref 0–0.2)
BASOPHILS NFR BLD AUTO: 0.6 % — SIGNIFICANT CHANGE UP (ref 0–2)
BILIRUB SERPL-MCNC: 0.3 MG/DL — SIGNIFICANT CHANGE UP (ref 0.2–1.2)
BUN SERPL-MCNC: 15 MG/DL — SIGNIFICANT CHANGE UP (ref 7–23)
CALCIUM SERPL-MCNC: 8.8 MG/DL — SIGNIFICANT CHANGE UP (ref 8.5–10.1)
CHLORIDE SERPL-SCNC: 106 MMOL/L — SIGNIFICANT CHANGE UP (ref 96–108)
CO2 SERPL-SCNC: 28 MMOL/L — SIGNIFICANT CHANGE UP (ref 22–31)
CREAT SERPL-MCNC: 0.82 MG/DL — SIGNIFICANT CHANGE UP (ref 0.5–1.3)
EOSINOPHIL # BLD AUTO: 0.12 K/UL — SIGNIFICANT CHANGE UP (ref 0–0.5)
EOSINOPHIL NFR BLD AUTO: 2.3 % — SIGNIFICANT CHANGE UP (ref 0–6)
GLUCOSE SERPL-MCNC: 90 MG/DL — SIGNIFICANT CHANGE UP (ref 70–99)
HCG SERPL-ACNC: <1 MIU/ML — SIGNIFICANT CHANGE UP
HCT VFR BLD CALC: 39.8 % — SIGNIFICANT CHANGE UP (ref 34.5–45)
HGB BLD-MCNC: 13.5 G/DL — SIGNIFICANT CHANGE UP (ref 11.5–15.5)
IMM GRANULOCYTES NFR BLD AUTO: 0.4 % — SIGNIFICANT CHANGE UP (ref 0–1.5)
LIDOCAIN IGE QN: 239 U/L — SIGNIFICANT CHANGE UP (ref 73–393)
LYMPHOCYTES # BLD AUTO: 1.36 K/UL — SIGNIFICANT CHANGE UP (ref 1–3.3)
LYMPHOCYTES # BLD AUTO: 25.7 % — SIGNIFICANT CHANGE UP (ref 13–44)
MCHC RBC-ENTMCNC: 30.7 PG — SIGNIFICANT CHANGE UP (ref 27–34)
MCHC RBC-ENTMCNC: 33.9 GM/DL — SIGNIFICANT CHANGE UP (ref 32–36)
MCV RBC AUTO: 90.5 FL — SIGNIFICANT CHANGE UP (ref 80–100)
MONOCYTES # BLD AUTO: 0.5 K/UL — SIGNIFICANT CHANGE UP (ref 0–0.9)
MONOCYTES NFR BLD AUTO: 9.5 % — SIGNIFICANT CHANGE UP (ref 2–14)
NEUTROPHILS # BLD AUTO: 3.26 K/UL — SIGNIFICANT CHANGE UP (ref 1.8–7.4)
NEUTROPHILS NFR BLD AUTO: 61.5 % — SIGNIFICANT CHANGE UP (ref 43–77)
NRBC # BLD: 0 /100 WBCS — SIGNIFICANT CHANGE UP (ref 0–0)
PLATELET # BLD AUTO: 217 K/UL — SIGNIFICANT CHANGE UP (ref 150–400)
POTASSIUM SERPL-MCNC: 3.9 MMOL/L — SIGNIFICANT CHANGE UP (ref 3.5–5.3)
POTASSIUM SERPL-SCNC: 3.9 MMOL/L — SIGNIFICANT CHANGE UP (ref 3.5–5.3)
PROT SERPL-MCNC: 7 GM/DL — SIGNIFICANT CHANGE UP (ref 6–8.3)
RBC # BLD: 4.4 M/UL — SIGNIFICANT CHANGE UP (ref 3.8–5.2)
RBC # FLD: 12.4 % — SIGNIFICANT CHANGE UP (ref 10.3–14.5)
SODIUM SERPL-SCNC: 139 MMOL/L — SIGNIFICANT CHANGE UP (ref 135–145)
WBC # BLD: 5.29 K/UL — SIGNIFICANT CHANGE UP (ref 3.8–10.5)
WBC # FLD AUTO: 5.29 K/UL — SIGNIFICANT CHANGE UP (ref 3.8–10.5)

## 2020-10-26 PROCEDURE — 99285 EMERGENCY DEPT VISIT HI MDM: CPT

## 2020-10-26 PROCEDURE — 74177 CT ABD & PELVIS W/CONTRAST: CPT | Mod: 26,MA

## 2020-10-26 RX ORDER — SODIUM CHLORIDE 9 MG/ML
1000 INJECTION INTRAMUSCULAR; INTRAVENOUS; SUBCUTANEOUS ONCE
Refills: 0 | Status: COMPLETED | OUTPATIENT
Start: 2020-10-26 | End: 2020-10-26

## 2020-10-26 RX ADMIN — SODIUM CHLORIDE 1000 MILLILITER(S): 9 INJECTION INTRAMUSCULAR; INTRAVENOUS; SUBCUTANEOUS at 15:41

## 2020-10-26 NOTE — ED PROVIDER NOTE - CONSTITUTIONAL, MLM
Well appearing, awake, alert, oriented to person, place, time/situation and in no apparent distress. Speaking in clear full sentences no nasal flaring no shoulders retractions no diaphoresis smiling appears very comfortable sitting up in the stretcher in a bright light room normal...

## 2020-10-26 NOTE — ED PROVIDER NOTE - PATIENT PORTAL LINK FT
You can access the FollowMyHealth Patient Portal offered by [facility] by registering at the following website: [email or web address]. By joining [employer]’s FollowMyHealth portal, you will also be able to view your health information using other applications (apps) compatible with our system.

## 2020-10-26 NOTE — ED PROVIDER NOTE - PROGRESS NOTE DETAILS
pt remains alert and oriented x 3 abd remain nontender nondistended x 4 q to palp. Pt is given and explained all test reports and advised to follow up with her pmd and gastroenterologist and Dr. Rebolledo urologist and return if symptoms persist or worsen.

## 2020-10-26 NOTE — ED ADULT NURSE NOTE - OBJECTIVE STATEMENT
A&Ox4, ambulating. pt c/o lower pelvic pain x 3daysradiating to scarum area, pain10/10, spasm. pt stated waking up with the pain on saturday AM. pt denies falls/injuries. pt denies N/V/D/C/sob/cp/headache/dizziness

## 2020-10-26 NOTE — ED PROVIDER NOTE - OBJECTIVE STATEMENT
55 years old female here c/o bilateral lower abd pain nausea vomiting and loose stool x 2 days. Pt sts she had normal colonoscopy 1 1/2 months ago pt has a hx of cervical ca with hysterectomy 2018. Pt denies headache, dizziness, blurred visions, light sensitivities, neck/back pain, focal/distal weakness or numbness, cough, sob, chest pain, fever, chills, dysuria or abnormal stool color.

## 2020-10-27 ENCOUNTER — NON-APPOINTMENT (OUTPATIENT)
Age: 55
End: 2020-10-27

## 2020-10-30 ENCOUNTER — APPOINTMENT (OUTPATIENT)
Dept: GASTROENTEROLOGY | Facility: CLINIC | Age: 55
End: 2020-10-30
Payer: COMMERCIAL

## 2020-10-30 DIAGNOSIS — R10.30 LOWER ABDOMINAL PAIN, UNSPECIFIED: ICD-10-CM

## 2020-10-30 DIAGNOSIS — K62.89 OTHER SPECIFIED DISEASES OF ANUS AND RECTUM: ICD-10-CM

## 2020-10-30 DIAGNOSIS — K21.9 GASTRO-ESOPHAGEAL REFLUX DISEASE W/OUT ESOPHAGITIS: ICD-10-CM

## 2020-10-30 DIAGNOSIS — K64.8 OTHER HEMORRHOIDS: ICD-10-CM

## 2020-10-30 PROCEDURE — 99215 OFFICE O/P EST HI 40 MIN: CPT | Mod: 95

## 2020-10-30 NOTE — CONSULT LETTER
[FreeTextEntry1] : Dear Dr. Alf Castro,\[facility] \[facility] I had the pleasure of seeing your patient JASSI BIRD in the office today.  My office note is attached. PLEASE READ THE "ASSESSMENT" SECTION OF THE NOTE TO SEE MY IMPRESSION AND PLAN.\par \[facility] Thank you very much for allowing me to participate in the care of your patient.\[facility] \[facility] Sincerely,\[facility] \[facility] Willi Novak M.D., FAC, [facility] Director, Celiac Program at [facility]  of Medicine\[facility] rich [facility] [employer] [facility] at [facility] [place]/[facility] Practice Director,[facility] - Gastroenterology/Internal Medicine at [place]\[facility] [address]\[place], [place] [address] Tel: [phone]\[facility] Email: [email or web address] \[facility] \[facility] The attached note has been created using a voice recognition system (Dragon).  There may be some misspellings and typos.  Please call my office if you have any issues or questions.

## 2020-10-30 NOTE — HISTORY OF PRESENT ILLNESS
[Home] : at home, [unfilled] , at the time of the visit. [Medical Office: ([facility])___] : at the medical office located in  [Verbal consent obtained from patient] : the patient, [unfilled] [FreeTextEntry1] : The patient was well until 6 days ago when she developed lower abdominal pain along with rectal spasms.  The pain worsened over the weekend and the patient went to the emergency room on Monday.  The patient did not have fever.  CT scan done there showed a cluster of nonobstructing right renal calculi and mild left hydronephrosis without stones on that side.  The colon showed retained stool throughout.  There was no evidence of diverticulitis.  The patient was sent home.  Her sister, an RN, apparently gave the patient Cipro 500 mg which she took twice a day for 5 doses.  She notes that the pain improved with this treatment.  She denies dysuria or hematuria.  She typically moves her bowels 3-4 times a day but states that she stopped going because of the rectal pain.  She now is constipated and has not moved her bowels for the past 36 hours or so.  She last had a colonoscopy in June of this year which showed moderate pandiverticulosis as well as internal and external hemorrhoids.\par \par On self-examination, the patient states that her abdomen is harder than usual but is nontender.\par \par The patient also has a history of reflux with focal intestinal metaplasia at the GE junction.  She was placed on pantoprazole but never took this.  She is using a homeopathic treatment and states that she feels well without heartburn.\par \par \par Note from 7/1/2020 - We reviewed the results of the EGD and colonoscopy performed on June 19, 2020.  EGD was grossly normal but biopsies showed focal intestinal metaplasia at the GE junction.  Colonoscopy was significant for removal of a polyp from the mid transverse colon which was hyperplastic.  The patient also has mild to moderate diverticulosis throughout the colon as well as internal and external hemorrhoids which do cause burning in her rectum.  Random right and left colonic biopsies were negative.  The patient reports intermittent episodes of heartburn that last for up to a week at a time occurring 2-3 times a month.  The patient takes Gaviscon with some relief.  She denies dysphasia.  She reports 4-5 loose bowel movements a day dating back to her radiation for cervical cancer.  She denies melena or bright red blood per rectum.\par \par \par Note from 2/28/2020 - The patient is a 54-year-old woman with a history of cervical cancer diagnosed in February 2018 treated with surgery, chemotherapy, and radiation.  She has intermittent heartburn with pain in the chest with swallowing.  She reports that food gets stuck.  This occurs about once a month.  She takes Tums on occasion with relief.  She denies abdominal pain.  She has 2-3 loose bowel movements a day without melena or bright red blood per rectum.  The patient's weight is stable.  The patient has not been admitted to the hospital in the past year and denies any cardiac issues.  The patient's last colonoscopy was in 2014.

## 2020-10-30 NOTE — REASON FOR VISIT
[Acute] : an acute visit [FreeTextEntry1] : Lower abdominal pain, rectal pain, reflux, internal hemorrhoids

## 2020-10-30 NOTE — ASSESSMENT
[FreeTextEntry1] : Patient with lower abdominal pain and rectal spasms.  CT scan showed kidney stones on the right and a mild left-sided hydronephrosis along with stool throughout the colon.  The patient took Cipro which she states helped the pain.  She is now constipated.\par \par Patient was advised to get MiraLAX and take 17 g tonight, tomorrow morning, and Sunday morning.  She was advised to continue this daily until her bowel movements returned to normal.\par \par Patient was given hydrocortisone suppositories 25 mg to use twice daily as needed as the rectal spasms are likely related to the internal hemorrhoids.\par \par Patient will call me next week to let me know how she is doing.  If she is having ongoing symptoms, I would need to see her in person so that I can perform a physical examination.\par \par Patient was referred to see a urologist.\par \par Patient is due for next EGD in June 2023 and colonoscopy in June 2025.\par \par \par Plan from 7/11/2020 - Patient with focal intestinal metaplasia at the GE junction.  She does have symptomatic reflux with episodic heartburn.  Colonoscopy was significant for removal of a hyperplastic polyp as well as diverticulosis and hemorrhoids which are symptomatic.\par \par The patient was started on pantoprazole 20 mg a day.  Additionally, A list of dietary and lifestyle modifications in the treatment of GERD was given to the patient.\par \par Patient was counseled regarding a high-fiber diet.\par \par Patient was given hydrocortisone 2.5% cream to use as needed for hemorrhoids.\par \par We will repeat an EGD in 3 years that is June 2023 and a colonoscopy in 5 years that is June 2025.\par \par Patient will return to see me in 3 months to assess her response to the pantoprazole.\par \par \par Plan from 2/28/2020 - Patient with complaints of intermittent heartburn and dysphasia along with daily loose stools.  She has a history of cervical cancer.\par \par An EGD and colonoscopy have been scheduled. The risks, benefits, alternatives, and limitations of the procedures, including the possibility of missed lesions, were explained.

## 2020-11-11 ENCOUNTER — APPOINTMENT (OUTPATIENT)
Dept: RADIATION ONCOLOGY | Facility: CLINIC | Age: 55
End: 2020-11-11
Payer: COMMERCIAL

## 2020-11-11 VITALS
DIASTOLIC BLOOD PRESSURE: 73 MMHG | HEART RATE: 75 BPM | TEMPERATURE: 98 F | SYSTOLIC BLOOD PRESSURE: 107 MMHG | BODY MASS INDEX: 23.23 KG/M2 | WEIGHT: 127 LBS | OXYGEN SATURATION: 98 % | RESPIRATION RATE: 16 BRPM

## 2020-11-11 PROCEDURE — 99072 ADDL SUPL MATRL&STAF TM PHE: CPT

## 2020-11-11 PROCEDURE — 99213 OFFICE O/P EST LOW 20 MIN: CPT

## 2020-11-11 NOTE — REVIEW OF SYSTEMS
[Diarrhea] : diarrhea [Vaginal Discharge] : vaginal discharge [Negative] : Heme/Lymph [Diarrhea: Grade 1 - Increase of <4 stools per day over baseline; mild increase in ostomy output compared to baseline] : Diarrhea: Grade 1 - Increase of <4 stools per day over baseline; mild increase in ostomy output compared to baseline [Rectal Pain: Grade 1 - Mild pain] : Rectal Pain: Grade 1 - Mild pain [Vaginal Infection: Grade 1] : Vaginal Infection: Grade 1

## 2020-11-13 NOTE — PHYSICAL EXAM
[Normal] : normal heart rate and rhythm, normal S1 and S2, and no murmurs present [Abdomen Soft] : soft [Nondistended] : nondistended [Normal External Genitalia] : normal external genitalia  [Normal Vaginal Cuff] : vaginal cuff without lesion or nodularity [Inguinal Lymph Nodes Enlarged Bilaterally] : inguinal [de-identified] : some telangectasia seen at apex scant yellow discharge

## 2020-11-13 NOTE — HISTORY OF PRESENT ILLNESS
[FreeTextEntry1] : Ms. Angelica Hay is a 54 year-old female with Stage IB cervical adenocarcinoma, s/p exploratory laparotomy / total abdominal hysterectomy / bilateral salpingectomy on 5/11/18. Initially she refused post operative treatment, but decided to proceed with the recommended chemotherapy and radiation therapy eventually. She received treatment to the pelvis/cervix to a total dose of 4500 cGy in 25 fractions. This was followed by brachytherapy to a total dose of 1500 cGy in 3 doses. Treatment was delivered from 9/27/18 - 11/16/18. She received chemotherapy under the care of Dr. Martins. \par \par She returns today for a routine follow up. Since last visit in October 2019:, she had CT A/P 2/4/2020 - no evidence of recurrence. Noted stable small left para-aortic lymph node measuring 1.0 x 0.6 cm in size image 36. Stable right external iliac lymph node series 2 image 73 measuring 1.6 x 0.6 cm.. EGD and colonoscopy performed on June 19, 2020. EGD was grossly normal but biopsies showed focal intestinal metaplasia at the GE junction. She was prescribed Protonix but has not started it. She is taking natural supplements instead. Will follow up with GI in 3 months.\par \par CT abdomen 10/26/20 to r/o diverticulitis for rectal spasm.IMPRESSION:\par Cluster of nonobstructing right renal calculi. Mild left hydronephrosis without left urolithiasis.\par Hysterectomy. Slightly prominent para-aortic lymph nodes.\par Constipated colon.\par Periampullary duodenal diverticulum.\par Mild hepatomegaly.\par Relatively osteopenic L4 and L5 vertebrae. Correlate clinically for prior radiation therapy.\par \par Today, She reports whitish vaginal discharge, was evaluated at urgent care , was prescribed Diflucan which gave a short term relief.\par She uses DHEA with Estroil ordered by Dr Malin ([facility]) for vaginal atrophy and no longer experiences dyspareunia. No urinary or bowel complaints. Baseline loose BMs. No vaginal bleeding.\par

## 2020-12-01 ENCOUNTER — NON-APPOINTMENT (OUTPATIENT)
Age: 55
End: 2020-12-01

## 2020-12-10 ENCOUNTER — APPOINTMENT (OUTPATIENT)
Dept: GYNECOLOGIC ONCOLOGY | Facility: CLINIC | Age: 55
End: 2020-12-10
Payer: COMMERCIAL

## 2020-12-10 VITALS
BODY MASS INDEX: 23.55 KG/M2 | HEART RATE: 72 BPM | DIASTOLIC BLOOD PRESSURE: 74 MMHG | HEIGHT: 62 IN | WEIGHT: 128 LBS | SYSTOLIC BLOOD PRESSURE: 105 MMHG

## 2020-12-10 PROCEDURE — 99072 ADDL SUPL MATRL&STAF TM PHE: CPT

## 2020-12-10 PROCEDURE — 99215 OFFICE O/P EST HI 40 MIN: CPT

## 2020-12-14 LAB — HPV HIGH+LOW RISK DNA PNL CVX: NOT DETECTED

## 2020-12-15 ENCOUNTER — APPOINTMENT (OUTPATIENT)
Dept: OBGYN | Facility: CLINIC | Age: 55
End: 2020-12-15
Payer: COMMERCIAL

## 2020-12-15 VITALS
WEIGHT: 129 LBS | TEMPERATURE: 97.1 F | DIASTOLIC BLOOD PRESSURE: 60 MMHG | HEIGHT: 62 IN | BODY MASS INDEX: 23.74 KG/M2 | SYSTOLIC BLOOD PRESSURE: 100 MMHG

## 2020-12-15 DIAGNOSIS — Z01.419 ENCOUNTER FOR GYNECOLOGICAL EXAMINATION (GENERAL) (ROUTINE) W/OUT ABNORMAL FINDINGS: ICD-10-CM

## 2020-12-15 PROCEDURE — 99386 PREV VISIT NEW AGE 40-64: CPT | Mod: 25

## 2020-12-15 PROCEDURE — 99072 ADDL SUPL MATRL&STAF TM PHE: CPT

## 2020-12-15 NOTE — DISCUSSION/SUMMARY
[FreeTextEntry1] : - Pap/HPV obtained by Dr. De Anda pts Gyn Oncologist\par - Mammo WNL from 6 months ago per patient\par - Colonoscopy screening up to date\par - Affirm obtained\par - Vulvar hygiene reviewed\par \par

## 2020-12-15 NOTE — PHYSICAL EXAM
[Appropriately responsive] : appropriately responsive [Alert] : alert [No Acute Distress] : no acute distress [Soft] : soft [Non-tender] : non-tender [Non-distended] : non-distended [No HSM] : No HSM [No Lesions] : no lesions [No Mass] : no mass [Oriented x3] : oriented x3 [Examination Of The Breasts] : a normal appearance [No Masses] : no breast masses were palpable [Labia Majora] : normal [Labia Minora] : normal [Normal] : normal [Absent] : absent

## 2020-12-15 NOTE — HISTORY OF PRESENT ILLNESS
[FreeTextEntry1] : 56yo  LMP 2018 w/h/o cervical cancer s/p radical hysterectomy, chemo/rad here for establishment of care.  Pt also reports she has had troublesome vaginal discharge for the past month.  Uses an herbal vaginal suppository for vaginal dryness.

## 2020-12-21 ENCOUNTER — NON-APPOINTMENT (OUTPATIENT)
Age: 55
End: 2020-12-21

## 2020-12-21 DIAGNOSIS — N76.0 ACUTE VAGINITIS: ICD-10-CM

## 2020-12-21 DIAGNOSIS — B96.89 ACUTE VAGINITIS: ICD-10-CM

## 2020-12-21 LAB
CANDIDA VAG CYTO: NOT DETECTED
CYTOLOGY CVX/VAG DOC THIN PREP: ABNORMAL
G VAGINALIS+PREV SP MTYP VAG QL MICRO: DETECTED
T VAGINALIS VAG QL WET PREP: NOT DETECTED

## 2020-12-22 ENCOUNTER — NON-APPOINTMENT (OUTPATIENT)
Age: 55
End: 2020-12-22

## 2021-01-08 ENCOUNTER — NON-APPOINTMENT (OUTPATIENT)
Age: 56
End: 2021-01-08

## 2021-01-14 ENCOUNTER — APPOINTMENT (OUTPATIENT)
Dept: PLASTIC SURGERY | Facility: CLINIC | Age: 56
End: 2021-01-14
Payer: SELF-PAY

## 2021-01-14 VITALS
HEIGHT: 62 IN | DIASTOLIC BLOOD PRESSURE: 78 MMHG | BODY MASS INDEX: 22.82 KG/M2 | SYSTOLIC BLOOD PRESSURE: 110 MMHG | HEART RATE: 71 BPM | OXYGEN SATURATION: 98 % | WEIGHT: 124 LBS | TEMPERATURE: 98.3 F

## 2021-01-14 PROCEDURE — 99024 POSTOP FOLLOW-UP VISIT: CPT

## 2021-03-05 DIAGNOSIS — Z78.0 ASYMPTOMATIC MENOPAUSAL STATE: ICD-10-CM

## 2021-03-08 ENCOUNTER — APPOINTMENT (OUTPATIENT)
Dept: OBGYN | Facility: CLINIC | Age: 56
End: 2021-03-08
Payer: COMMERCIAL

## 2021-03-08 ENCOUNTER — LABORATORY RESULT (OUTPATIENT)
Age: 56
End: 2021-03-08

## 2021-03-08 VITALS
BODY MASS INDEX: 22.45 KG/M2 | SYSTOLIC BLOOD PRESSURE: 100 MMHG | DIASTOLIC BLOOD PRESSURE: 76 MMHG | HEIGHT: 62 IN | WEIGHT: 122 LBS

## 2021-03-08 DIAGNOSIS — Z87.42 PERSONAL HISTORY OF OTHER DISEASES OF THE FEMALE GENITAL TRACT: ICD-10-CM

## 2021-03-08 DIAGNOSIS — N76.0 ACUTE VAGINITIS: ICD-10-CM

## 2021-03-08 DIAGNOSIS — N95.2 POSTMENOPAUSAL ATROPHIC VAGINITIS: ICD-10-CM

## 2021-03-08 DIAGNOSIS — N89.8 OTHER SPECIFIED NONINFLAMMATORY DISORDERS OF VAGINA: ICD-10-CM

## 2021-03-08 DIAGNOSIS — Z85.41 PERSONAL HISTORY OF MALIGNANT NEOPLASM OF CERVIX UTERI: ICD-10-CM

## 2021-03-08 PROCEDURE — 99213 OFFICE O/P EST LOW 20 MIN: CPT

## 2021-03-08 PROCEDURE — 99072 ADDL SUPL MATRL&STAF TM PHE: CPT

## 2021-03-11 NOTE — HISTORY OF PRESENT ILLNESS
[Patient reported mammogram was normal] : Patient reported mammogram was normal [Patient reported breast sonogram was normal] : Patient reported breast sonogram was normal [Patient reported PAP Smear was normal] : Patient reported PAP Smear was normal [Patient reported colonoscopy was normal] : Patient reported colonoscopy was normal [TextBox_4] : LAST YEAST INFECTION STARTED IN 10/2020 \par PT WAS TREATED WITH FLUTRICAZOLE BY [facility] MD A FEW MONTHS BEFORE PT WAS SEEN BY DR JESSICA DE LOS SANTOS AND TREATED WITH FLAGILE AROUND 11/2020\par PT PRESENTS WITH THRUSH AND STATES WHEN THRUSH PRESENTS SHE PRODUCES MORE YEAST THAN NORMAL [Mammogramdate] : 02/20 [BreastSonogramDate] : 02/20 [PapSmeardate] : 09/20 [ColonoscopyDate] : 07/20 [LMPDate] : 51YO [FreeTextEntry1] : 53YO

## 2021-03-11 NOTE — PHYSICAL EXAM
[Appropriately responsive] : appropriately responsive [Alert] : alert [No Acute Distress] : no acute distress [Soft] : soft [Non-tender] : non-tender [Non-distended] : non-distended [Oriented x3] : oriented x3 [Pink Rugae] : pink rugae [Absent] : absent [Uterine Adnexae] : absent [FreeTextEntry5] : VAGINAL PHYSIOLOGIC DISCHARGE- WNL- NO EVIDENCE OF CANDIDA SEEN TODAY, VAGINAL CUFF INTACT WITH SLIGHT AREA OF ATROPHIC MUCOSA- REMAINDER OF VAGINAL CANAL APPEARS WELL ESTROGENIZED [FreeTextEntry9] : NA

## 2021-03-11 NOTE — DISCUSSION/SUMMARY
[FreeTextEntry1] : 56 Y/O S/P NADEGE AND B/L SALPINGECTOMY FOR ADENOCARCINOMA OF CERVIX IN SITU\par ACUTE VAGINITIS\par VAGINAL DISCHARGE\par R/O MONILIA- NO EVIDENCE OF MONILIA NOW\par TO TREAT PENDING RESULTS\par PT TOLD TO CALL IN 5 DAYS\par PT REFERRED TO DR BASILIO DE LA FUENTE\par VAG CX PERFORMED

## 2021-03-18 ENCOUNTER — APPOINTMENT (OUTPATIENT)
Dept: PLASTIC SURGERY | Facility: CLINIC | Age: 56
End: 2021-03-18

## 2021-04-08 ENCOUNTER — APPOINTMENT (OUTPATIENT)
Dept: PLASTIC SURGERY | Facility: CLINIC | Age: 56
End: 2021-04-08
Payer: COMMERCIAL

## 2021-04-08 PROCEDURE — 99072 ADDL SUPL MATRL&STAF TM PHE: CPT

## 2021-04-09 NOTE — REASON FOR VISIT
[Follow-Up: _____] : a [unfilled] follow-up visit [FreeTextEntry1] : Pt presents in office today for Botox injections to her face.

## 2021-04-30 NOTE — H&P PST ADULT - NEUROLOGICAL DETAILS
April 30, 2021     Patient: Yani Quintanilla   YOB: 1978   Date of Visit: 4/30/2021       To Whom it May Concern:    Yani Quintanilla was seen in my clinic on 4/30/2021 at 11:00 am.    Please excuse Yani for her absence from work 4/30/2021-5/2/2021. Yani may resume work 5/3/2021.      Sincerely,         Thelma Hanson MD    Medical information is confidential and cannot be disclosed without the written consent of the patient or her representative.       responds to verbal commands/responds to pain/alert and oriented x 3/sensation intact/normal strength

## 2021-07-16 ENCOUNTER — APPOINTMENT (OUTPATIENT)
Dept: PLASTIC SURGERY | Facility: CLINIC | Age: 56
End: 2021-07-16
Payer: COMMERCIAL

## 2021-07-16 PROCEDURE — 99072 ADDL SUPL MATRL&STAF TM PHE: CPT

## 2021-09-16 ENCOUNTER — APPOINTMENT (OUTPATIENT)
Dept: GYNECOLOGIC ONCOLOGY | Facility: CLINIC | Age: 56
End: 2021-09-16
Payer: COMMERCIAL

## 2021-09-16 VITALS
BODY MASS INDEX: 22.69 KG/M2 | WEIGHT: 123.31 LBS | SYSTOLIC BLOOD PRESSURE: 101 MMHG | HEART RATE: 69 BPM | DIASTOLIC BLOOD PRESSURE: 63 MMHG | HEIGHT: 62 IN

## 2021-09-16 DIAGNOSIS — Z86.001 PERSONAL HISTORY OF IN-SITU NEOPLASM OF CERVIX UTERI: ICD-10-CM

## 2021-09-16 PROCEDURE — 99214 OFFICE O/P EST MOD 30 MIN: CPT

## 2021-09-16 RX ORDER — FLUCONAZOLE 200 MG/1
200 TABLET ORAL DAILY
Qty: 7 | Refills: 1 | Status: DISCONTINUED | COMMUNITY
Start: 2020-11-17 | End: 2021-09-16

## 2021-09-16 RX ORDER — METRONIDAZOLE 500 MG/1
500 TABLET ORAL TWICE DAILY
Qty: 14 | Refills: 0 | Status: DISCONTINUED | COMMUNITY
Start: 2020-12-21 | End: 2021-09-16

## 2021-09-16 RX ORDER — HYDROCORTISONE ACETATE 25 MG/1
25 SUPPOSITORY RECTAL
Qty: 20 | Refills: 1 | Status: DISCONTINUED | COMMUNITY
Start: 2020-10-30 | End: 2021-09-16

## 2021-09-17 LAB — HPV HIGH+LOW RISK DNA PNL CVX: NOT DETECTED

## 2021-09-22 LAB — CYTOLOGY CVX/VAG DOC THIN PREP: ABNORMAL

## 2021-09-28 ENCOUNTER — APPOINTMENT (OUTPATIENT)
Dept: CT IMAGING | Facility: CLINIC | Age: 56
End: 2021-09-28
Payer: COMMERCIAL

## 2021-09-28 ENCOUNTER — OUTPATIENT (OUTPATIENT)
Dept: OUTPATIENT SERVICES | Facility: HOSPITAL | Age: 56
LOS: 1 days | End: 2021-09-28
Payer: COMMERCIAL

## 2021-09-28 DIAGNOSIS — C53.9 MALIGNANT NEOPLASM OF CERVIX UTERI, UNSPECIFIED: ICD-10-CM

## 2021-09-28 DIAGNOSIS — M21.612 BUNION OF LEFT FOOT: Chronic | ICD-10-CM

## 2021-09-28 DIAGNOSIS — Z98.891 HISTORY OF UTERINE SCAR FROM PREVIOUS SURGERY: Chronic | ICD-10-CM

## 2021-09-28 DIAGNOSIS — Z98.82 BREAST IMPLANT STATUS: Chronic | ICD-10-CM

## 2021-09-28 DIAGNOSIS — Z98.890 OTHER SPECIFIED POSTPROCEDURAL STATES: Chronic | ICD-10-CM

## 2021-09-28 DIAGNOSIS — M21.611 BUNION OF RIGHT FOOT: Chronic | ICD-10-CM

## 2021-09-28 DIAGNOSIS — Z00.8 ENCOUNTER FOR OTHER GENERAL EXAMINATION: ICD-10-CM

## 2021-09-28 PROCEDURE — 74177 CT ABD & PELVIS W/CONTRAST: CPT

## 2021-09-28 PROCEDURE — 74177 CT ABD & PELVIS W/CONTRAST: CPT | Mod: 26

## 2021-10-04 ENCOUNTER — APPOINTMENT (OUTPATIENT)
Dept: GASTROENTEROLOGY | Facility: CLINIC | Age: 56
End: 2021-10-04
Payer: COMMERCIAL

## 2021-10-04 VITALS
BODY MASS INDEX: 23 KG/M2 | WEIGHT: 125 LBS | DIASTOLIC BLOOD PRESSURE: 82 MMHG | TEMPERATURE: 97.5 F | HEIGHT: 62 IN | SYSTOLIC BLOOD PRESSURE: 110 MMHG | HEART RATE: 69 BPM | OXYGEN SATURATION: 97 %

## 2021-10-04 DIAGNOSIS — R14.0 ABDOMINAL DISTENSION (GASEOUS): ICD-10-CM

## 2021-10-04 DIAGNOSIS — R10.9 UNSPECIFIED ABDOMINAL PAIN: ICD-10-CM

## 2021-10-04 DIAGNOSIS — R15.0 INCOMPLETE DEFECATION: ICD-10-CM

## 2021-10-04 PROCEDURE — 36415 COLL VENOUS BLD VENIPUNCTURE: CPT

## 2021-10-04 PROCEDURE — 99214 OFFICE O/P EST MOD 30 MIN: CPT | Mod: 25

## 2021-10-05 ENCOUNTER — NON-APPOINTMENT (OUTPATIENT)
Age: 56
End: 2021-10-05

## 2021-10-05 LAB
ALBUMIN SERPL ELPH-MCNC: 4.9 G/DL
ALP BLD-CCNC: 95 U/L
ALT SERPL-CCNC: 25 U/L
ANION GAP SERPL CALC-SCNC: 13 MMOL/L
AST SERPL-CCNC: 27 U/L
BASOPHILS # BLD AUTO: 0.05 K/UL
BASOPHILS NFR BLD AUTO: 1 %
BILIRUB SERPL-MCNC: 0.3 MG/DL
BUN SERPL-MCNC: 18 MG/DL
CALCIUM SERPL-MCNC: 9.9 MG/DL
CHLORIDE SERPL-SCNC: 101 MMOL/L
CO2 SERPL-SCNC: 27 MMOL/L
CREAT SERPL-MCNC: 0.8 MG/DL
ENDOMYSIUM IGA SER QL: NEGATIVE
ENDOMYSIUM IGA TITR SER: NORMAL
EOSINOPHIL # BLD AUTO: 0.14 K/UL
EOSINOPHIL NFR BLD AUTO: 2.7 %
GGT SERPL-CCNC: 8 U/L
GLIADIN IGA SER QL: <5 UNITS
GLIADIN IGG SER QL: <5 UNITS
GLIADIN PEPTIDE IGA SER-ACNC: NEGATIVE
GLIADIN PEPTIDE IGG SER-ACNC: NEGATIVE
GLUCOSE SERPL-MCNC: 95 MG/DL
HCT VFR BLD CALC: 44.9 %
HGB BLD-MCNC: 15 G/DL
IGA SER QL IEP: 173 MG/DL
IMM GRANULOCYTES NFR BLD AUTO: 0.4 %
LYMPHOCYTES # BLD AUTO: 1.5 K/UL
LYMPHOCYTES NFR BLD AUTO: 28.7 %
MAN DIFF?: NORMAL
MCHC RBC-ENTMCNC: 31.1 PG
MCHC RBC-ENTMCNC: 33.4 GM/DL
MCV RBC AUTO: 93.2 FL
MONOCYTES # BLD AUTO: 0.49 K/UL
MONOCYTES NFR BLD AUTO: 9.4 %
NEUTROPHILS # BLD AUTO: 3.02 K/UL
NEUTROPHILS NFR BLD AUTO: 57.8 %
PLATELET # BLD AUTO: 228 K/UL
POTASSIUM SERPL-SCNC: 4.2 MMOL/L
PROT SERPL-MCNC: 7.6 G/DL
RBC # BLD: 4.82 M/UL
RBC # FLD: 12.5 %
SODIUM SERPL-SCNC: 141 MMOL/L
TSH SERPL-ACNC: 1.64 UIU/ML
TTG IGA SER IA-ACNC: <1.2 U/ML
TTG IGA SER-ACNC: NEGATIVE
TTG IGG SER IA-ACNC: 7.3 U/ML
TTG IGG SER IA-ACNC: ABNORMAL
WBC # FLD AUTO: 5.22 K/UL

## 2021-10-05 NOTE — HISTORY OF PRESENT ILLNESS
[FreeTextEntry1] : The patient presents with complaints of lower abdominal cramping and distention since August.  She denies nausea or vomiting.  She also notes that her bowel movements have changed.  She has 3-4 small bowel movements a day which are incomplete.  She denies melena or bright red blood per rectum although her stools are darker.  Of note, the patient takes psyllium powder every morning for the past 6 months.  The patient's weight is stable.  She had a CT scan a few days ago on September 28, 2021 for her history of cervical cancer, which did not show any significant findings and no explanation for the patient's cramping.  She also has been taking multiple supplements from a holistic doctor for the past year.  The patient reports being under increased stress.  Her last colonoscopy was on June 19, 2020, significant only for diverticulosis and hemorrhoids.  The patient has a history of focal intestinal metaplasia at the GE junction.\par \par \par Note from 10/30/2020 - The patient was well until 6 days ago when she developed lower abdominal pain along with rectal spasms.  The pain worsened over the weekend and the patient went to the emergency room on Monday.  The patient did not have fever.  CT scan done there showed a cluster of nonobstructing right renal calculi and mild left hydronephrosis without stones on that side.  The colon showed retained stool throughout.  There was no evidence of diverticulitis.  The patient was sent home.  Her sister, an RN, apparently gave the patient Cipro 500 mg which she took twice a day for 5 doses.  She notes that the pain improved with this treatment.  She denies dysuria or hematuria.  She typically moves her bowels 3-4 times a day but states that she stopped going because of the rectal pain.  She now is constipated and has not moved her bowels for the past 36 hours or so.  She last had a colonoscopy in June of this year which showed moderate pandiverticulosis as well as internal and external hemorrhoids.\par \par On self-examination, the patient states that her abdomen is harder than usual but is nontender.\par \par The patient also has a history of reflux with focal intestinal metaplasia at the GE junction.  She was placed on pantoprazole but never took this.  She is using a homeopathic treatment and states that she feels well without heartburn.\par \par \par Note from 7/1/2020 - We reviewed the results of the EGD and colonoscopy performed on June 19, 2020.  EGD was grossly normal but biopsies showed focal intestinal metaplasia at the GE junction.  Colonoscopy was significant for removal of a polyp from the mid transverse colon which was hyperplastic.  The patient also has mild to moderate diverticulosis throughout the colon as well as internal and external hemorrhoids which do cause burning in her rectum.  Random right and left colonic biopsies were negative.  The patient reports intermittent episodes of heartburn that last for up to a week at a time occurring 2-3 times a month.  The patient takes Gaviscon with some relief.  She denies dysphasia.  She reports 4-5 loose bowel movements a day dating back to her radiation for cervical cancer.  She denies melena or bright red blood per rectum.\par \par \par Note from 2/28/2020 - The patient is a 54-year-old woman with a history of cervical cancer diagnosed in February 2018 treated with surgery, chemotherapy, and radiation.  She has intermittent heartburn with pain in the chest with swallowing.  She reports that food gets stuck.  This occurs about once a month.  She takes Tums on occasion with relief.  She denies abdominal pain.  She has 2-3 loose bowel movements a day without melena or bright red blood per rectum.  The patient's weight is stable.  The patient has not been admitted to the hospital in the past year and denies any cardiac issues.  The patient's last colonoscopy was in 2014.

## 2021-10-05 NOTE — ASSESSMENT
[FreeTextEntry1] : Patient with complaints of lower abdominal cramping and distention along with incomplete bowel movements.  There were no significant findings on CT scan performed last week.  The patient takes psyllium powder daily.  I suspect that the psyllium is causing gas and distention.  She also takes multiple supplements.\par \par I advised the patient to stop the psyllium and change to Citrucel powder once a day.\par \par Patient was also advised to stop the supplements.\par \par Bloodwork was sent for CBC, chem-pack, TSH, celiac markers.\par \par The patient will return to see me in 1 month to assess her response to these interventions.\par \par Patient is due for EGD in June 2023 for her history of focal intestinal metaplasia at the GE junction.  She is due for her next colonoscopy in June 2025.\par \par \par Thank you 10/30/2020 - Patient with lower abdominal pain and rectal spasms.  CT scan showed kidney stones on the right and a mild left-sided hydronephrosis along with stool throughout the colon.  The patient took Cipro which she states helped the pain.  She is now constipated.\par \par Patient was advised to get MiraLAX and take 17 g tonight, tomorrow morning, and Sunday morning.  She was advised to continue this daily until her bowel movements returned to normal.\par \par Patient was given hydrocortisone suppositories 25 mg to use twice daily as needed as the rectal spasms are likely related to the internal hemorrhoids.\par \par Patient will call me next week to let me know how she is doing.  If she is having ongoing symptoms, I would need to see her in person so that I can perform a physical examination.\par \par Patient was referred to see a urologist.\par \par Patient is due for next EGD in June 2023 and colonoscopy in June 2025.\par \par \par Plan from 7/11/2020 - Patient with focal intestinal metaplasia at the GE junction.  She does have symptomatic reflux with episodic heartburn.  Colonoscopy was significant for removal of a hyperplastic polyp as well as diverticulosis and hemorrhoids which are symptomatic.\par \par The patient was started on pantoprazole 20 mg a day.  Additionally, A list of dietary and lifestyle modifications in the treatment of GERD was given to the patient.\par \par Patient was counseled regarding a high-fiber diet.\par \par Patient was given hydrocortisone 2.5% cream to use as needed for hemorrhoids.\par \par We will repeat an EGD in 3 years that is June 2023 and a colonoscopy in 5 years that is June 2025.\par \par Patient will return to see me in 3 months to assess her response to the pantoprazole.\par \par \par Plan from 2/28/2020 - Patient with complaints of intermittent heartburn and dysphasia along with daily loose stools.  She has a history of cervical cancer.\par \par An EGD and colonoscopy have been scheduled. The risks, benefits, alternatives, and limitations of the procedures, including the possibility of missed lesions, were explained.

## 2021-10-05 NOTE — CONSULT LETTER
[FreeTextEntry1] : Dear Dr. Alf Castro,\[facility] \[facility] I had the pleasure of seeing your patient JASSI BIRD in the office today.  My office note is attached. PLEASE READ THE "ASSESSMENT" SECTION OF THE NOTE TO SEE MY IMPRESSION AND PLAN.\par \[facility] Thank you very much for allowing me to participate in the care of your patient.\[facility] \[facility] Sincerely,\[facility] \[facility] Willi Novak M.D., FAC, [facility] Director, Celiac Program at [facility]  of Medicine\[facility] rich [facility] at [facility] Practice Director,[facility] - Gastroenterology/Internal Medicine at [place]\[facility] [address]\[place], [place] [address] Tel: [phone]\[facility] Email: [email or web address] \[facility] \[facility] The attached note has been created using a voice recognition system (Dragon).  There may be some misspellings and typos.  Please call my office if you have any issues or questions.

## 2021-10-19 NOTE — ASSESSMENT
[FreeTextEntry1] : The patient would like to undergo Botox injections at this time. Before the procedure the patient's name and  were confirmed with the patient. It was confirmed that the patient would not be allergic to the injection. The patient's skin was sterilely prepped. She received ____34_______ units total: ___14____ units to the forehead, ___8____ units to the glabella, ____6____ units to the crow's feet bilaterally. Patient tolerated injections well with no complications. Ice was applied to the injected areas and was advised to continue to ice for the rest of the day to decrease swelling. Patient was advised to avoid drinking alcohol, strenuous activities, and heavy exercise for the next 24 hours.\par \par Follow up PRN.\par

## 2021-10-21 ENCOUNTER — NON-APPOINTMENT (OUTPATIENT)
Age: 56
End: 2021-10-21

## 2021-11-09 ENCOUNTER — APPOINTMENT (OUTPATIENT)
Dept: GASTROENTEROLOGY | Facility: CLINIC | Age: 56
End: 2021-11-09

## 2021-12-06 ENCOUNTER — TRANSCRIPTION ENCOUNTER (OUTPATIENT)
Age: 56
End: 2021-12-06

## 2022-01-15 ENCOUNTER — APPOINTMENT (OUTPATIENT)
Dept: PLASTIC SURGERY | Facility: CLINIC | Age: 57
End: 2022-01-15
Payer: SELF-PAY

## 2022-01-15 VITALS
DIASTOLIC BLOOD PRESSURE: 58 MMHG | HEART RATE: 77 BPM | SYSTOLIC BLOOD PRESSURE: 122 MMHG | WEIGHT: 122 LBS | BODY MASS INDEX: 22.45 KG/M2 | HEIGHT: 62 IN | OXYGEN SATURATION: 98 % | TEMPERATURE: 98.2 F

## 2022-01-15 DIAGNOSIS — L98.8 OTHER SPECIFIED DISORDERS OF THE SKIN AND SUBCUTANEOUS TISSUE: ICD-10-CM

## 2022-01-18 PROBLEM — L98.8 FACIAL RHYTIDS: Status: ACTIVE | Noted: 2017-01-22

## 2022-02-25 ENCOUNTER — APPOINTMENT (OUTPATIENT)
Dept: GYNECOLOGIC ONCOLOGY | Facility: CLINIC | Age: 57
End: 2022-02-25
Payer: COMMERCIAL

## 2022-03-25 ENCOUNTER — APPOINTMENT (OUTPATIENT)
Dept: GYNECOLOGIC ONCOLOGY | Facility: CLINIC | Age: 57
End: 2022-03-25
Payer: COMMERCIAL

## 2022-03-25 VITALS
SYSTOLIC BLOOD PRESSURE: 121 MMHG | WEIGHT: 126 LBS | DIASTOLIC BLOOD PRESSURE: 76 MMHG | HEART RATE: 87 BPM | HEIGHT: 62 IN | BODY MASS INDEX: 23.19 KG/M2

## 2022-03-25 PROCEDURE — 99214 OFFICE O/P EST MOD 30 MIN: CPT

## 2022-04-11 PROBLEM — Z11.59 SCREENING FOR VIRAL DISEASE: Status: RESOLVED | Noted: 2018-06-21 | Resolved: 2022-04-11

## 2022-04-21 ENCOUNTER — APPOINTMENT (OUTPATIENT)
Dept: PLASTIC SURGERY | Facility: CLINIC | Age: 57
End: 2022-04-21

## 2022-12-14 PROBLEM — K57.92 DIVERTICULITIS OF INTESTINE, PART UNSPECIFIED, WITHOUT PERFORATION OR ABSCESS WITHOUT BLEEDING: Chronic | Status: ACTIVE | Noted: 2020-10-26

## 2022-12-14 PROBLEM — C53.9 MALIGNANT NEOPLASM OF CERVIX UTERI, UNSPECIFIED: Chronic | Status: ACTIVE | Noted: 2020-10-26

## 2022-12-30 ENCOUNTER — APPOINTMENT (OUTPATIENT)
Dept: GYNECOLOGIC ONCOLOGY | Facility: CLINIC | Age: 57
End: 2022-12-30

## 2023-02-01 ENCOUNTER — NON-APPOINTMENT (OUTPATIENT)
Age: 58
End: 2023-02-01

## 2023-02-17 ENCOUNTER — LABORATORY RESULT (OUTPATIENT)
Age: 58
End: 2023-02-17

## 2023-02-17 ENCOUNTER — APPOINTMENT (OUTPATIENT)
Dept: GYNECOLOGIC ONCOLOGY | Facility: CLINIC | Age: 58
End: 2023-02-17
Payer: COMMERCIAL

## 2023-02-17 VITALS
HEIGHT: 62 IN | WEIGHT: 134 LBS | HEART RATE: 82 BPM | BODY MASS INDEX: 24.66 KG/M2 | DIASTOLIC BLOOD PRESSURE: 82 MMHG | SYSTOLIC BLOOD PRESSURE: 119 MMHG

## 2023-02-17 PROCEDURE — 99214 OFFICE O/P EST MOD 30 MIN: CPT

## 2023-02-17 RX ORDER — ESTRADIOL 0.1 MG/G
0.1 CREAM VAGINAL
Qty: 1 | Refills: 5 | Status: ACTIVE | COMMUNITY
Start: 2023-02-17 | End: 1900-01-01

## 2023-02-17 NOTE — DISCUSSION/SUMMARY
[FreeTextEntry1] : Signs and symptoms of recurrence reviewed in detail, namely vaginal bleeding, abdominal pain, changes in bowel habits or urination, nausea/vomiting.\par F/u in 6 months or sooner prn\par Plan for imaging as clinically indicated\par Recommend GI evaluation\par Continue annual gyn evaluation with Dr. Shaffer\par

## 2023-02-17 NOTE — HISTORY OF PRESENT ILLNESS
[FreeTextEntry1] : IB1 cervical adenocarcinoma 5/11/2018\par Pelvic EBRT 4500 cGy + brachytherapy 1500 cGy (9/27/18-11/16/18) with concurrent Cisplatin\par \par Last seen 3/2022\par \par She saw Dr. Shaffer in 3/2021, due for annual gyn exam.  Last mammogram 2 years ago\par Reports postcoital bleeding which had improved then had one episode of bright blood after intercourse about a month ago.  Using over the counter inserts for vaginal dryness.  Normal urination, no pelvic pain.  She reports urgency with bowel movements.  has not seen GI recently\par

## 2023-02-23 DIAGNOSIS — R92.2 INCONCLUSIVE MAMMOGRAM: ICD-10-CM

## 2023-03-06 LAB — CYTOLOGY CVX/VAG DOC THIN PREP: ABNORMAL

## 2023-03-10 ENCOUNTER — NON-APPOINTMENT (OUTPATIENT)
Age: 58
End: 2023-03-10

## 2023-03-27 ENCOUNTER — APPOINTMENT (OUTPATIENT)
Dept: OBGYN | Facility: CLINIC | Age: 58
End: 2023-03-27

## 2023-04-07 ENCOUNTER — APPOINTMENT (OUTPATIENT)
Dept: GASTROENTEROLOGY | Facility: CLINIC | Age: 58
End: 2023-04-07

## 2023-04-07 ENCOUNTER — APPOINTMENT (OUTPATIENT)
Dept: MAMMOGRAPHY | Facility: CLINIC | Age: 58
End: 2023-04-07

## 2023-04-07 ENCOUNTER — APPOINTMENT (OUTPATIENT)
Dept: ULTRASOUND IMAGING | Facility: CLINIC | Age: 58
End: 2023-04-07

## 2023-04-11 ENCOUNTER — APPOINTMENT (OUTPATIENT)
Dept: OBGYN | Facility: CLINIC | Age: 58
End: 2023-04-11

## 2023-04-13 ENCOUNTER — APPOINTMENT (OUTPATIENT)
Dept: GASTROENTEROLOGY | Facility: CLINIC | Age: 58
End: 2023-04-13
Payer: COMMERCIAL

## 2023-04-13 DIAGNOSIS — R13.10 DYSPHAGIA, UNSPECIFIED: ICD-10-CM

## 2023-04-13 DIAGNOSIS — K22.70 BARRETT'S ESOPHAGUS W/OUT DYSPLASIA: ICD-10-CM

## 2023-04-13 DIAGNOSIS — R10.9 UNSPECIFIED ABDOMINAL PAIN: ICD-10-CM

## 2023-04-13 DIAGNOSIS — R15.2 FECAL URGENCY: ICD-10-CM

## 2023-04-13 DIAGNOSIS — R68.81 EARLY SATIETY: ICD-10-CM

## 2023-04-13 PROCEDURE — 99443: CPT | Mod: 95

## 2023-04-14 NOTE — HISTORY OF PRESENT ILLNESS
[Home] : at home, [unfilled] , at the time of the visit. [Medical Office: ([facility])___] : at the medical office located in  [Verbal consent obtained from patient] : the patient, [unfilled] [FreeTextEntry1] : This was a telephonic visit.  The patient is seen for the first time since her last visit on October 4, 2021.  Blood work from that day was normal.  She has a history of focal intestinal metaplasia at the GE junction.  She also has a history of cervical cancer which was treated with radiation and chemotherapy.  She complains of intermittent dysphagia and odynophagia.  She states that she will get a few episodes in the same week and then can go a month without it.  She denies regurgitation.  She gets infrequent heartburn about once a month.  She denies nausea or vomiting.  She does note early satiety and also complains of pain behind the umbilicus.  She has ongoing symptoms of fecal urgency with 3-4 bowel movements a day that are not formed.  She has excessive gas.  She states that the symptoms date back to her radiation.  On colonoscopy in June 2020, no significant findings were seen and random right and left colonic biopsies were negative.  The patient denies melena or bright red blood per rectum.  She does state that the symptoms have been somewhat worse over the past 6 to 7 months.  The patient has gained weight.  The patient has not been admitted to the hospital in the past year and denies any cardiac issues.\par \par \par Note from 10/4/2021 - The patient presents with complaints of lower abdominal cramping and distention since August.  She denies nausea or vomiting.  She also notes that her bowel movements have changed.  She has 3-4 small bowel movements a day which are incomplete.  She denies melena or bright red blood per rectum although her stools are darker.  Of note, the patient takes psyllium powder every morning for the past 6 months.  The patient's weight is stable.  She had a CT scan a few days ago on September 28, 2021 for her history of cervical cancer, which did not show any significant findings and no explanation for the patient's cramping.  She also has been taking multiple supplements from a holistic doctor for the past year.  The patient reports being under increased stress.  Her last colonoscopy was on June 19, 2020, significant only for diverticulosis and hemorrhoids.  The patient has a history of focal intestinal metaplasia at the GE junction.\par \par \par Note from 10/30/2020 - The patient was well until 6 days ago when she developed lower abdominal pain along with rectal spasms.  The pain worsened over the weekend and the patient went to the emergency room on Monday.  The patient did not have fever.  CT scan done there showed a cluster of nonobstructing right renal calculi and mild left hydronephrosis without stones on that side.  The colon showed retained stool throughout.  There was no evidence of diverticulitis.  The patient was sent home.  Her sister, an RN, apparently gave the patient Cipro 500 mg which she took twice a day for 5 doses.  She notes that the pain improved with this treatment.  She denies dysuria or hematuria.  She typically moves her bowels 3-4 times a day but states that she stopped going because of the rectal pain.  She now is constipated and has not moved her bowels for the past 36 hours or so.  She last had a colonoscopy in June of this year which showed moderate pandiverticulosis as well as internal and external hemorrhoids.\par \par On self-examination, the patient states that her abdomen is harder than usual but is nontender.\par \par The patient also has a history of reflux with focal intestinal metaplasia at the GE junction.  She was placed on pantoprazole but never took this.  She is using a homeopathic treatment and states that she feels well without heartburn.\par \par \par Note from 7/1/2020 - We reviewed the results of the EGD and colonoscopy performed on June 19, 2020.  EGD was grossly normal but biopsies showed focal intestinal metaplasia at the GE junction.  Colonoscopy was significant for removal of a polyp from the mid transverse colon which was hyperplastic.  The patient also has mild to moderate diverticulosis throughout the colon as well as internal and external hemorrhoids which do cause burning in her rectum.  Random right and left colonic biopsies were negative.  The patient reports intermittent episodes of heartburn that last for up to a week at a time occurring 2-3 times a month.  The patient takes Gaviscon with some relief.  She denies dysphasia.  She reports 4-5 loose bowel movements a day dating back to her radiation for cervical cancer.  She denies melena or bright red blood per rectum.\par \par \par Note from 2/28/2020 - The patient is a 54-year-old woman with a history of cervical cancer diagnosed in February 2018 treated with surgery, chemotherapy, and radiation.  She has intermittent heartburn with pain in the chest with swallowing.  She reports that food gets stuck.  This occurs about once a month.  She takes Tums on occasion with relief.  She denies abdominal pain.  She has 2-3 loose bowel movements a day without melena or bright red blood per rectum.  The patient's weight is stable.  The patient has not been admitted to the hospital in the past year and denies any cardiac issues.  The patient's last colonoscopy was in 2014.

## 2023-04-14 NOTE — CONSULT LETTER
[FreeTextEntry1] : Dear Dr. Alf Castro,\par \par I had the pleasure of seeing your patient JASSI BIRD in the office today.  My office note is attached. PLEASE READ THE "ASSESSMENT" SECTION OF THE NOTE TO SEE MY IMPRESSION AND PLAN.\par \par Thank you very much for allowing me to participate in the care of your patient.\par \par Sincerely,\par \par Willi Novak M.D., FAC, FACP\par Director, Celiac Program at [facility]\par  of Medicine, [facility] [employer] at [facility] Adjunct  of Medicine, [facility] Practice Director, [facility] - Gastroenterology at [facility] [address]\[place] [address]\par Tel: [phone]\par Email: [email or web address] \par \par The attached note has been created using a voice recognition system (Dragon).  There may be some misspellings and typos.  Please call my office if you have any issues or questions.

## 2023-04-14 NOTE — REASON FOR VISIT
[FreeTextEntry1] : Intestinal metaplasia of the GE junction, dysphagia, odynophagia, early satiety, abdominal pain, fecal urgency

## 2023-04-14 NOTE — ASSESSMENT
[FreeTextEntry1] : Patient with a history of focal intestinal metaplasia at the GE junction who has intermittent dysphagia, odynophagia, and heartburn.  She also notes early satiety.  She has ongoing fecal urgency and unformed stools which she dates back to her radiation for cervical cancer.\par \par The patient was sent for a CT enterography to evaluate for the possibility of radiation enteritis.\par \par Bloodwork will be sent for CBC, chem-pack, TSH, celiac markers, sed rate, C-reactive protein, IBD serologies.\par \par Stool studies will be sent for a GI infectious PCR panel, C. diff by PCR, calprotectin, and fecal elastase.\par \par An EGD has been scheduled. The risks, benefits, alternatives, and limitations of the procedure were explained.\par \par Patient is due for colonoscopy in June 2025.  If her current symptoms continue to worsen without explanation, we may consider a colonoscopy sooner.\par \par \par Plan from 10/4/2021 - Patient with complaints of lower abdominal cramping and distention along with incomplete bowel movements.  There were no significant findings on CT scan performed last week.  The patient takes psyllium powder daily.  I suspect that the psyllium is causing gas and distention.  She also takes multiple supplements.\par \par I advised the patient to stop the psyllium and change to Citrucel powder once a day.\par \par Patient was also advised to stop the supplements.\par \par Bloodwork was sent for CBC, chem-pack, TSH, celiac markers.\par \par The patient will return to see me in 1 month to assess her response to these interventions.\par \par Patient is due for EGD in June 2023 for her history of focal intestinal metaplasia at the GE junction.  She is due for her next colonoscopy in June 2025.\par \par \par Thank you 10/30/2020 - Patient with lower abdominal pain and rectal spasms.  CT scan showed kidney stones on the right and a mild left-sided hydronephrosis along with stool throughout the colon.  The patient took Cipro which she states helped the pain.  She is now constipated.\par \par Patient was advised to get MiraLAX and take 17 g tonight, tomorrow morning, and Sunday morning.  She was advised to continue this daily until her bowel movements returned to normal.\par \par Patient was given hydrocortisone suppositories 25 mg to use twice daily as needed as the rectal spasms are likely related to the internal hemorrhoids.\par \par Patient will call me next week to let me know how she is doing.  If she is having ongoing symptoms, I would need to see her in person so that I can perform a physical examination.\par \par Patient was referred to see a urologist.\par \par Patient is due for next EGD in June 2023 and colonoscopy in June 2025.\par \par \par Plan from 7/11/2020 - Patient with focal intestinal metaplasia at the GE junction.  She does have symptomatic reflux with episodic heartburn.  Colonoscopy was significant for removal of a hyperplastic polyp as well as diverticulosis and hemorrhoids which are symptomatic.\par \par The patient was started on pantoprazole 20 mg a day.  Additionally, A list of dietary and lifestyle modifications in the treatment of GERD was given to the patient.\par \par Patient was counseled regarding a high-fiber diet.\par \par Patient was given hydrocortisone 2.5% cream to use as needed for hemorrhoids.\par \par We will repeat an EGD in 3 years that is June 2023 and a colonoscopy in 5 years that is June 2025.\par \par Patient will return to see me in 3 months to assess her response to the pantoprazole.\par \par \par Plan from 2/28/2020 - Patient with complaints of intermittent heartburn and dysphasia along with daily loose stools.  She has a history of cervical cancer.\par \par An EGD and colonoscopy have been scheduled. The risks, benefits, alternatives, and limitations of the procedures, including the possibility of missed lesions, were explained.

## 2023-05-29 ENCOUNTER — NON-APPOINTMENT (OUTPATIENT)
Age: 58
End: 2023-05-29

## 2023-06-17 ENCOUNTER — OUTPATIENT (OUTPATIENT)
Dept: OUTPATIENT SERVICES | Facility: HOSPITAL | Age: 58
LOS: 1 days | End: 2023-06-17
Payer: COMMERCIAL

## 2023-06-17 ENCOUNTER — APPOINTMENT (OUTPATIENT)
Dept: CT IMAGING | Facility: CLINIC | Age: 58
End: 2023-06-17
Payer: COMMERCIAL

## 2023-06-17 DIAGNOSIS — Z98.890 OTHER SPECIFIED POSTPROCEDURAL STATES: Chronic | ICD-10-CM

## 2023-06-17 DIAGNOSIS — Z98.82 BREAST IMPLANT STATUS: Chronic | ICD-10-CM

## 2023-06-17 DIAGNOSIS — M21.611 BUNION OF RIGHT FOOT: Chronic | ICD-10-CM

## 2023-06-17 DIAGNOSIS — K22.70 BARRETT'S ESOPHAGUS WITHOUT DYSPLASIA: ICD-10-CM

## 2023-06-17 DIAGNOSIS — Z98.891 HISTORY OF UTERINE SCAR FROM PREVIOUS SURGERY: Chronic | ICD-10-CM

## 2023-06-17 DIAGNOSIS — M21.612 BUNION OF LEFT FOOT: Chronic | ICD-10-CM

## 2023-06-17 PROCEDURE — 74177 CT ABD & PELVIS W/CONTRAST: CPT | Mod: 26

## 2023-06-17 PROCEDURE — 74177 CT ABD & PELVIS W/CONTRAST: CPT

## 2023-06-26 ENCOUNTER — NON-APPOINTMENT (OUTPATIENT)
Age: 58
End: 2023-06-26

## 2023-07-07 ENCOUNTER — APPOINTMENT (OUTPATIENT)
Dept: GASTROENTEROLOGY | Facility: AMBULATORY MEDICAL SERVICES | Age: 58
End: 2023-07-07
Payer: COMMERCIAL

## 2023-07-07 PROCEDURE — 43239 EGD BIOPSY SINGLE/MULTIPLE: CPT

## 2023-08-25 ENCOUNTER — NON-APPOINTMENT (OUTPATIENT)
Age: 58
End: 2023-08-25

## 2023-10-01 PROBLEM — Z92.3 HISTORY OF RADIATION THERAPY: Status: RESOLVED | Noted: 2020-02-28 | Resolved: 2023-10-01

## 2024-03-28 PROBLEM — C53.9 CERVICAL ADENOCARCINOMA: Status: ACTIVE | Noted: 2018-06-04

## 2024-03-29 ENCOUNTER — APPOINTMENT (OUTPATIENT)
Dept: GYNECOLOGIC ONCOLOGY | Facility: CLINIC | Age: 59
End: 2024-03-29
Payer: COMMERCIAL

## 2024-03-29 ENCOUNTER — LABORATORY RESULT (OUTPATIENT)
Age: 59
End: 2024-03-29

## 2024-03-29 VITALS
SYSTOLIC BLOOD PRESSURE: 122 MMHG | DIASTOLIC BLOOD PRESSURE: 82 MMHG | HEART RATE: 85 BPM | BODY MASS INDEX: 22.41 KG/M2 | WEIGHT: 122.5 LBS

## 2024-03-29 DIAGNOSIS — C53.9 MALIGNANT NEOPLASM OF CERVIX UTERI, UNSPECIFIED: ICD-10-CM

## 2024-03-29 PROCEDURE — 99214 OFFICE O/P EST MOD 30 MIN: CPT

## 2024-03-29 NOTE — REVIEW OF SYSTEMS
[Negative] : Musculoskeletal [Vaginal Discharge] : vaginal discharge [FreeTextEntry4] : as in HPI [de-identified] : as in HPI

## 2024-03-29 NOTE — HISTORY OF PRESENT ILLNESS
[FreeTextEntry1] : IB1 cervical adenocarcinoma 5/11/2018 Pelvic EBRT 4500 cGy + brachytherapy 1500 cGy (9/27/18-11/16/18) with concurrent Cisplatin  Last seen 2/2023  Pap in 2/2023 ASCUS, HPV (+) 16/18/45 (+).  CT in 6/2023 (-)  Still reports gassiness, takes probiotics, saw GI. No vaginal bleeding or discharge.  No pelvic pain

## 2024-03-29 NOTE — PHYSICAL EXAM
[Chaperone Present] : A chaperone was present in the examining room during all aspects of the physical examination [Absent] : Cervix: Absent [Normal] : Anus and perineum: Normal sphincter tone, no masses, no prolapse. [de-identified] : healed abdominoplasty scar

## 2024-03-29 NOTE — DISCUSSION/SUMMARY
[FreeTextEntry1] : Signs and symptoms of recurrence reviewed in detail, namely vaginal bleeding, abdominal pain, changes in bowel habits or urination, nausea/vomiting. F/u in 1 year or sooner prn Plan for imaging as clinically indicated Continue vaginal paps yearly (performed today)

## 2024-04-08 LAB — CYTOLOGY CVX/VAG DOC THIN PREP: ABNORMAL

## 2024-07-19 NOTE — ASU PREOP CHECKLIST - HEIGHT IN FEET
Continue and make an appointment.    Patient notified.    Appointment scheduled.  
Pt has taken budesonide-formoterol (SYMBICORT) 80-4.5 MCG/ACT AERO [[id number]] for about a week now she says, and it is not helping at all. She would like a different medication.         Please call and advise pt.       
5

## 2024-10-08 ENCOUNTER — APPOINTMENT (OUTPATIENT)
Dept: GASTROENTEROLOGY | Facility: CLINIC | Age: 59
End: 2024-10-08
Payer: COMMERCIAL

## 2024-10-08 DIAGNOSIS — K22.2 ESOPHAGEAL OBSTRUCTION: ICD-10-CM

## 2024-10-08 DIAGNOSIS — K44.9 DIAPHRAGMATIC HERNIA W/OUT OBSTRUCTION OR GANGRENE: ICD-10-CM

## 2024-10-08 DIAGNOSIS — Z86.0100 PERSONAL HISTORY OF COLON POLYPS, UNSPECIFIED: ICD-10-CM

## 2024-10-08 PROCEDURE — 99213 OFFICE O/P EST LOW 20 MIN: CPT

## 2024-10-08 RX ORDER — SODIUM PICOSULFATE, MAGNESIUM OXIDE, AND ANHYDROUS CITRIC ACID 12; 3.5; 1 G/175ML; G/175ML; MG/175ML
10-3.5-12 MG-GM LIQUID ORAL
Qty: 2 | Refills: 0 | Status: ACTIVE | COMMUNITY
Start: 2024-10-08 | End: 1900-01-01

## 2025-03-03 NOTE — PROGRESS NOTE ADULT - SUBJECTIVE AND OBJECTIVE BOX
Fax received from [facility] regarding Pt  Pt location at SNF: [address]  Fax sent by: RN    Fax regarding concern: Labs from 3/3                Any recommendations or new orders?     Gyn ONC Progress Note POD #3    Subjective:   Pt seen and examined at bedside. Reports pain around incision, and face, better with medications. Patient is ambulating. Passing flatus. Tolerating regular diet. Pt denies fever, chills, chest pain, SOB, nausea, vomiting, lightheadedness, dizziness.      Objective:  T(F): 99.1 (05-14-18 @ 06:01), Max: 99.7 (05-13-18 @ 13:49)  HR: 75 (05-14-18 @ 06:01) (63 - 90)  BP: 112/67 (05-14-18 @ 06:01) (93/57 - 112/67)  RR: 18 (05-14-18 @ 06:01) (17 - 18)  SpO2: 100% (05-14-18 @ 06:01) (95% - 100%)  I&O's Summary    13 May 2018 07:01  -  14 May 2018 07:00  --------------------------------------------------------  IN: 0 mL / OUT: 1110.5 mL / NET: -1110.5 mL      CAPILLARY BLOOD GLUCOSE          MEDICATIONS  (STANDING):  acetaminophen   Tablet. 975 milliGRAM(s) Oral every 6 hours  cephalexin 500 milliGRAM(s) Oral four times a day  docusate sodium 100 milliGRAM(s) Oral two times a day  sodium chloride 0.9% lock flush 3 milliLiter(s) IV Push every 8 hours    MEDICATIONS  (PRN):  oxyCODONE    IR 5 milliGRAM(s) Oral every 3 hours PRN Severe Pain (7 - 10)  simethicone 80 milliGRAM(s) Chew four times a day PRN Indigestion      Physical Exam:  Constitutional: NAD, A+O x3  Face: facial bruising expected per PRS  CV: RRR  Lungs: clear to auscultation bilaterally  Abdomen: soft, nondistended, no guarding, no rebound, normal bowel sounds  Incision: low horizontal incision clean, dry, intact  Extremities: no lower extremity edema or calf tenderness bilaterally; venodynes in place    LABS:    05-14    138    |  99     |  10     ----------------------------<  100<H>  3.4<L>   |  26     |  0.62   05-13    135    |  99     |  8      ----------------------------<  114<H>  3.8     |  25     |  0.72     Ca    8.1<L>      14 May 2018 04:15  Ca    8.1<L>      13 May 2018 04:27  Phos  2.5       05-14  Phos  1.8       05-13  Mg     2.0       05-14  Mg     2.0       05-13

## 2025-07-03 RX ORDER — SODIUM SULFATE, POTASSIUM SULFATE AND MAGNESIUM SULFATE 1.6; 3.13; 17.5 G/177ML; G/177ML; G/177ML
17.5-3.13-1.6 SOLUTION ORAL
Qty: 1 | Refills: 0 | Status: ACTIVE | COMMUNITY
Start: 2025-07-03 | End: 1900-01-01

## 2025-07-09 ENCOUNTER — APPOINTMENT (OUTPATIENT)
Dept: GASTROENTEROLOGY | Facility: AMBULATORY MEDICAL SERVICES | Age: 60
End: 2025-07-09
Payer: COMMERCIAL

## 2025-07-09 PROCEDURE — 43249 ESOPH EGD DILATION <30 MM: CPT | Mod: 59

## 2025-07-09 PROCEDURE — 43239 EGD BIOPSY SINGLE/MULTIPLE: CPT | Mod: 59

## 2025-07-09 PROCEDURE — 45380 COLONOSCOPY AND BIOPSY: CPT | Mod: 33

## 2025-07-09 RX ORDER — PANTOPRAZOLE 20 MG/1
20 TABLET, DELAYED RELEASE ORAL
Qty: 90 | Refills: 1 | Status: ACTIVE | COMMUNITY
Start: 2025-07-09 | End: 1900-01-01